# Patient Record
Sex: MALE | Race: WHITE | NOT HISPANIC OR LATINO | Employment: OTHER | URBAN - METROPOLITAN AREA
[De-identification: names, ages, dates, MRNs, and addresses within clinical notes are randomized per-mention and may not be internally consistent; named-entity substitution may affect disease eponyms.]

---
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
## 2022-04-10 ENCOUNTER — HISTORICAL (OUTPATIENT)
Dept: ADMINISTRATIVE | Facility: HOSPITAL | Age: 22
End: 2022-04-10

## 2022-04-27 VITALS — BODY MASS INDEX: 24.3 KG/M2 | WEIGHT: 169.75 LBS | HEIGHT: 70 IN

## 2022-07-25 ENCOUNTER — HOSPITAL ENCOUNTER (OUTPATIENT)
Dept: RADIOLOGY | Facility: HOSPITAL | Age: 22
Discharge: HOME | End: 2022-07-25
Attending: ORTHOPAEDIC SURGERY
Payer: OTHER GOVERNMENT

## 2022-07-25 DIAGNOSIS — S83.242A ACUTE MEDIAL MENISCUS TEAR OF LEFT KNEE: ICD-10-CM

## 2022-07-25 PROCEDURE — 73721 MRI KNEE WITHOUT CONTRAST LEFT: ICD-10-PCS | Mod: 26,LT,, | Performed by: RADIOLOGY

## 2022-07-25 PROCEDURE — 73721 MRI JNT OF LWR EXTRE W/O DYE: CPT | Mod: TC,LT

## 2022-07-25 PROCEDURE — 73721 MRI JNT OF LWR EXTRE W/O DYE: CPT | Mod: 26,LT,, | Performed by: RADIOLOGY

## 2022-08-15 ENCOUNTER — HOSPITAL ENCOUNTER (OUTPATIENT)
Dept: PREADMISSION TESTING | Facility: OTHER | Age: 22
Discharge: HOME | End: 2022-08-15
Attending: ORTHOPAEDIC SURGERY
Payer: OTHER GOVERNMENT

## 2022-08-15 VITALS
BODY MASS INDEX: 23.7 KG/M2 | SYSTOLIC BLOOD PRESSURE: 118 MMHG | WEIGHT: 160 LBS | OXYGEN SATURATION: 97 % | TEMPERATURE: 98 F | HEIGHT: 69 IN | DIASTOLIC BLOOD PRESSURE: 72 MMHG | HEART RATE: 76 BPM

## 2022-08-15 RX ORDER — SODIUM CHLORIDE, SODIUM LACTATE, POTASSIUM CHLORIDE, CALCIUM CHLORIDE 600; 310; 30; 20 MG/100ML; MG/100ML; MG/100ML; MG/100ML
INJECTION, SOLUTION INTRAVENOUS CONTINUOUS
Status: CANCELLED | OUTPATIENT
Start: 2022-08-15

## 2022-08-15 RX ORDER — ACETAMINOPHEN 500 MG
1000 TABLET ORAL
Status: CANCELLED | OUTPATIENT
Start: 2022-08-15 | End: 2022-08-15

## 2022-08-15 NOTE — DISCHARGE INSTRUCTIONS
Information to Prepare you for your Surgery    PRE-ADMIT TESTING -  [phone]    [address] [facility]          Your surgery has been scheduled at [facility]. We are pleased to have the opportunity to serve you. For Further Information please call [phone].    On the day of surgery please report to the Information Desk on the 1st floor.    CONTACT YOUR PHYSICIAN'S OFFICE THE DAY PRIOR TO YOUR SURGERY TO OBTAIN YOUR ARRIVAL TIME.     The evening before surgery do not eat anything after 9 p.m. ( this includes hard candy, chewing gum and mints).  You may only have GATORADE, POWERADE AND WATER  from 9 p.m. until you leave your home.   DO NOT DRINK ANY LIQUIDS ON THE WAY TO THE HOSPITAL.      Why does your anesthesiologist allow you to drink Gatorade/Powerade before surgery?  Gatorade/Powerade helps to increase your comfort before surgery and to decrease your nausea after surgery. The carbohydrates in Gatorade/Powerade help reduce your body's stress response to surgery.  If you are a diabetic-drink only water prior to surgery.      Current Visitor policy(12/27/2021) - Patients may have 2 visitors pre and post procedure. Only 2 visitors will be allowed in the Surgical building with the patient.     SPECIAL MEDICATION INSTRUCTIONS: TAKE medications checked off by the Anesthesiologist on your Medication List.    Angiogram Patients: Take medications as instructed by your physician, including aspirin.     Surgery Patients:    If you take ASPIRIN - Your PHYSICIAN/SURGEON will need to inform you IF/OR when you need to stop taking aspirin prior to your surgery.     Do Not take any medications containing IBUPROFEN.    Do Not Wear any make-up (especially eye make-up) to surgery. Please remove any false eyelashes or eyelash extensions. If you arrive the day of surgery with makeup/eyelashes on you will be required to remove prior to surgery. (There is a risk of corneal  abrasions if eye makeup/eyelash extensions are not removed)      Leave all valuables at home.   Do Not wear any jewelry or watches, including any metal in body piercings. Jewelry must be removed prior to coming to the hospital.  There is a possibility that rings that are unable to be removed may be cut off if they are on the surgical extremity.    Please remove all hair extensions, wigs, clips and any other metal accessories/ ornaments from your hair.  These items may pose a flammable/fire risk in Surgery and must be removed.    Do not shave your surgical area at least 5 days prior to your surgery. The surgical prep will be performed at the hospital according to Infection Control regulations.    Contact Lens must be removed before surgery. Either do not wear the contact lens or bring a case and solution for storage.  Please bring a container for eyeglasses or dentures as required.  Bring any paperwork your physician has provided, such as consent forms,  history and physicals, doctor's orders, etc.   Bring comfortable clothes that are loose fitting to wear upon discharge. Take into consideration the type of surgery being performed.  Maintain your diet as advised per your physician the day prior to surgery.      Adequate rest the night before surgery is advised.   Park in the Parking lot behind the hospital or in the [employer] [facility] across the street from the parking lot. Parking is complimentary.  If you will be discharged the same day as your procedure, please arrange for a responsible adult to drive you home or to accompany you if traveling by taxi.   YOU WILL NOT BE PERMITTED TO DRIVE OR TO LEAVE THE HOSPITAL ALONE AFTER SURGERY.   If you are being discharged the same day, it is strongly recommended that you arrange for someone to remain with you for the first 24 hrs following your surgery.    The Surgeon will speak to your family/visitor after your surgery regarding the outcome of your surgery and post op  care.  The Surgeon may speak to you after your surgery, but there is a possibility you may not remember the details.  Please check with your family members regarding the conversation with the Surgeon.    We strongly recommend whoever is bringing you home be present for discharge instructions.  This will ensure a thorough understanding for your post op home care.    ALL CHILDREN MUST ALWAYS BE ACCOMPANIED BY AN ADULT.    Visitors-Refer to current Visitor policy handouts.    Thank you for your cooperation.  The Staff of [facility].            Bathing Instructions with Hibiclens    Shower the evening before and morning of your procedure with Hibiclens:  Wash your face with water and your regular face wash/soap  Apply Hibiclens directly on your skin or on a wet washcloth and wash gently. When showering: Move away from the shower stream when applying Hibiclens to avoid rinsing off too soon.  Rinse thoroughly with warm water  Do not dilute Hibiclens        Dry off as usual, do not use any deodorant, powder, body lotions, perfume, after shave or cologne.

## 2022-10-17 ENCOUNTER — HOSPITAL ENCOUNTER (OUTPATIENT)
Dept: RADIOLOGY | Facility: CLINIC | Age: 22
Discharge: HOME | End: 2022-10-17
Attending: ORTHOPAEDIC SURGERY
Payer: COMMERCIAL

## 2022-10-17 ENCOUNTER — OFFICE VISIT (OUTPATIENT)
Dept: ORTHOPEDICS | Facility: CLINIC | Age: 22
End: 2022-10-17
Payer: COMMERCIAL

## 2022-10-17 VITALS
HEIGHT: 69 IN | SYSTOLIC BLOOD PRESSURE: 110 MMHG | TEMPERATURE: 98 F | BODY MASS INDEX: 23.99 KG/M2 | WEIGHT: 162 LBS | HEART RATE: 75 BPM | DIASTOLIC BLOOD PRESSURE: 69 MMHG

## 2022-10-17 DIAGNOSIS — S83.512A RUPTURE OF ANTERIOR CRUCIATE LIGAMENT OF LEFT KNEE, INITIAL ENCOUNTER: Primary | ICD-10-CM

## 2022-10-17 DIAGNOSIS — S83.242A ACUTE MEDIAL MENISCUS TEAR OF LEFT KNEE, INITIAL ENCOUNTER: ICD-10-CM

## 2022-10-17 DIAGNOSIS — M25.562 ACUTE PAIN OF LEFT KNEE: ICD-10-CM

## 2022-10-17 PROCEDURE — 3078F DIAST BP <80 MM HG: CPT | Mod: CPTII,,, | Performed by: ORTHOPAEDIC SURGERY

## 2022-10-17 PROCEDURE — 3074F PR MOST RECENT SYSTOLIC BLOOD PRESSURE < 130 MM HG: ICD-10-PCS | Mod: CPTII,,, | Performed by: ORTHOPAEDIC SURGERY

## 2022-10-17 PROCEDURE — 3078F PR MOST RECENT DIASTOLIC BLOOD PRESSURE < 80 MM HG: ICD-10-PCS | Mod: CPTII,,, | Performed by: ORTHOPAEDIC SURGERY

## 2022-10-17 PROCEDURE — 99203 OFFICE O/P NEW LOW 30 MIN: CPT | Mod: ,,, | Performed by: ORTHOPAEDIC SURGERY

## 2022-10-17 PROCEDURE — 1159F MED LIST DOCD IN RCRD: CPT | Mod: CPTII,,, | Performed by: ORTHOPAEDIC SURGERY

## 2022-10-17 PROCEDURE — 3074F SYST BP LT 130 MM HG: CPT | Mod: CPTII,,, | Performed by: ORTHOPAEDIC SURGERY

## 2022-10-17 PROCEDURE — 73564 X-RAY EXAM KNEE 4 OR MORE: CPT | Mod: LT,,, | Performed by: ORTHOPAEDIC SURGERY

## 2022-10-17 PROCEDURE — 1159F PR MEDICATION LIST DOCUMENTED IN MEDICAL RECORD: ICD-10-PCS | Mod: CPTII,,, | Performed by: ORTHOPAEDIC SURGERY

## 2022-10-17 PROCEDURE — 73564 XR KNEE COMP 4 OR MORE VIEWS LEFT: ICD-10-PCS | Mod: LT,,, | Performed by: ORTHOPAEDIC SURGERY

## 2022-10-17 PROCEDURE — 99203 PR OFFICE/OUTPT VISIT, NEW, LEVL III, 30-44 MIN: ICD-10-PCS | Mod: ,,, | Performed by: ORTHOPAEDIC SURGERY

## 2022-10-17 RX ORDER — SODIUM CHLORIDE 9 MG/ML
INJECTION, SOLUTION INTRAVENOUS CONTINUOUS
Status: CANCELLED | OUTPATIENT
Start: 2022-10-17

## 2022-10-17 NOTE — PROGRESS NOTES
Chief Complaint:   Chief Complaint   Patient presents with    Left Knee - Pain    Knee Pain     Hurt March 30 playing basketball and states he came down and just had lots of pain and swollen. Saw an orthopedic in [place] but couldn't get surgery done there. MRI shows an acl tear and meniscus tear. Not in a lot of pain but if he tweaks it then it hurts and takes medicine for it.       Consulting Physician: No ref. provider found    History of present illness:    he is a pleasant 22 y.o. year old male who on March 30 was playing some basketball and states when he came down with ball he might have hit another players knee and was in lots of pain and got swollen over the next few days. Was in [place] and went and saw a doctor there where he got a MRI done and it showed an anterior cruciate ligament tear and meniscus tear. Could not have surgery done there due to his work schedule. Would like to have surgery done now as soon as he can so that he can get back to work.  He has been protecting it over the last 6 months.  He is participating in home exercise program.  He will use over-the-counter medications intermittently.  He is in the [employer].  He has had a persistent instability of the knee with advanced activities.     History reviewed. No pertinent past medical history.    History reviewed. No pertinent surgical history.    No current outpatient medications on file.     No current facility-administered medications for this visit.       Review of patient's allergies indicates:  No Known Allergies    History reviewed. No pertinent family history.    Social History     Socioeconomic History    Marital status: Single   Tobacco Use    Smoking status: Never    Smokeless tobacco: Never   Substance and Sexual Activity    Alcohol use: Not Currently     Comment: occasional    Drug use: Never    Sexual activity: Not Currently     Partners: Female       Review of Systems:    Constitution:   Denies chills, fever, and  sweats.  HENT:   Denies headaches or blurry vision.  Cardiovascular:  Denies chest pain or irregular heart beat.  Respiratory:   Denies cough or shortness of breath.  Gastrointestinal:  Denies abdominal pain, nausea, or vomiting.  Musculoskeletal:   Denies muscle cramps.  Neurological:   Denies dizziness or focal weakness.  Psychiatric/Behavior: Normal mental status.  Hematology/Lymph:  Denies bleeding problem or easy bruising/bleeding.  Skin:    Denies rash or suspicious lesions.    Examination:    Vital Signs:    Vitals:    10/17/22 1431   PainSc:   3       There is no height or weight on file to calculate BMI.    Constitution:   Well-developed, well nourished patient in no acute distress.  Neurological:   Alert and oriented x 3 and cooperative to examination.     Psychiatric/Behavior: Normal mental status.  Respiratory:   No shortness of breath.  Eyes:    Extraoccular muscles intact  Skin:    No scars, rash or suspicious lesions.    MSK:   Standing exam  stance: normal alignment, no significant leg-length discrepancy  gait: normal    Knee examination  - General comments: unremarkable appearance    - Tenderness:medial jointline    Knee                  RIGHT    LEFT  Skin:                  Intact      Intact  ROM:                 0-130      0-130  Effusion:             Neg        trace  MJL TTP:           Neg         +  LJL TTP:            Neg         Neg  Elizabeth:         Neg          +  Pat crep:            Neg         Neg  Patella TTPs:     Neg         Neg  Patella grind:      Neg        Neg  Lachman:           Neg         +  Pivot shift:          Neg         +  Valgus stress:    Neg        Neg  Varus stress:      Neg        Neg  Posterior drawer: Neg       Neg    N-V intact intact  Hip: nml nml    Lower extremity edema:Negative     Imaging: X-rays ordered and images interpreted today personally by me of 4 views of the left knee show normal bony alignment.       Assessment: Rupture of anterior cruciate  ligament of left knee, initial encounter  -     X-Ray Knee Complete 4 or More Views Left; Future; Expected date: 10/17/2022    Acute medial meniscus tear of left knee, initial encounter      Plan:  I recommended surgical intervention:  Left knee arthroscopic ACL reconstruction with patellar tendon autograft, medial meniscus repair.  We discussed the details of the procedure and expected postoperative course.  We discussed the benefits of surgery which be to stabilize the knee.  We discussed the risks of surgery which is small but could be significant if he has retear, pain, stiffness, or infection.  After discussion like to proceed.  Plans for surgery October 27

## 2022-10-17 NOTE — H&P (VIEW-ONLY)
Chief Complaint:   Chief Complaint   Patient presents with    Left Knee - Pain    Knee Pain     Hurt March 30 playing basketball and states he came down and just had lots of pain and swollen. Saw an orthopedic in [place] but couldn't get surgery done there. MRI shows an acl tear and meniscus tear. Not in a lot of pain but if he tweaks it then it hurts and takes medicine for it.       Consulting Physician: No ref. provider found    History of present illness:    he is a pleasant 22 y.o. year old male who on March 30 was playing some basketball and states when he came down with ball he might have hit another players knee and was in lots of pain and got swollen over the next few days. Was in [place] and went and saw a doctor there where he got a MRI done and it showed an anterior cruciate ligament tear and meniscus tear. Could not have surgery done there due to his work schedule. Would like to have surgery done now as soon as he can so that he can get back to work.  He has been protecting it over the last 6 months.  He is participating in home exercise program.  He will use over-the-counter medications intermittently.  He is in [employer].  He has had a persistent instability of the knee with advanced activities.     History reviewed. No pertinent past medical history.    History reviewed. No pertinent surgical history.    No current outpatient medications on file.     No current facility-administered medications for this visit.       Review of patient's allergies indicates:  No Known Allergies    History reviewed. No pertinent family history.    Social History     Socioeconomic History    Marital status: Single   Tobacco Use    Smoking status: Never    Smokeless tobacco: Never   Substance and Sexual Activity    Alcohol use: Not Currently     Comment: occasional    Drug use: Never    Sexual activity: Not Currently     Partners: Female       Review of Systems:    Constitution:   Denies chills, fever, and  sweats.  HENT:   Denies headaches or blurry vision.  Cardiovascular:  Denies chest pain or irregular heart beat.  Respiratory:   Denies cough or shortness of breath.  Gastrointestinal:  Denies abdominal pain, nausea, or vomiting.  Musculoskeletal:   Denies muscle cramps.  Neurological:   Denies dizziness or focal weakness.  Psychiatric/Behavior: Normal mental status.  Hematology/Lymph:  Denies bleeding problem or easy bruising/bleeding.  Skin:    Denies rash or suspicious lesions.    Examination:    Vital Signs:    Vitals:    10/17/22 1431   PainSc:   3       There is no height or weight on file to calculate BMI.    Constitution:   Well-developed, well nourished patient in no acute distress.  Neurological:   Alert and oriented x 3 and cooperative to examination.     Psychiatric/Behavior: Normal mental status.  Respiratory:   No shortness of breath.  Eyes:    Extraoccular muscles intact  Skin:    No scars, rash or suspicious lesions.    MSK:   Standing exam  stance: normal alignment, no significant leg-length discrepancy  gait: normal    Knee examination  - General comments: unremarkable appearance    - Tenderness:medial jointline    Knee                  RIGHT    LEFT  Skin:                  Intact      Intact  ROM:                 0-130      0-130  Effusion:             Neg        trace  MJL TTP:           Neg         +  LJL TTP:            Neg         Neg  Elizabeth:         Neg          +  Pat crep:            Neg         Neg  Patella TTPs:     Neg         Neg  Patella grind:      Neg        Neg  Lachman:           Neg         +  Pivot shift:          Neg         +  Valgus stress:    Neg        Neg  Varus stress:      Neg        Neg  Posterior drawer: Neg       Neg    N-V intact intact  Hip: nml nml    Lower extremity edema:Negative     Imaging: X-rays ordered and images interpreted today personally by me of 4 views of the left knee show normal bony alignment.       Assessment: Rupture of anterior cruciate  ligament of left knee, initial encounter  -     X-Ray Knee Complete 4 or More Views Left; Future; Expected date: 10/17/2022    Acute medial meniscus tear of left knee, initial encounter      Plan:  I recommended surgical intervention:  Left knee arthroscopic ACL reconstruction with patellar tendon autograft, medial meniscus repair.  We discussed the details of the procedure and expected postoperative course.  We discussed the benefits of surgery which be to stabilize the knee.  We discussed the risks of surgery which is small but could be significant if he has retear, pain, stiffness, or infection.  After discussion like to proceed.  Plans for surgery October 27

## 2022-10-25 ENCOUNTER — ANESTHESIA EVENT (OUTPATIENT)
Dept: SURGERY | Facility: HOSPITAL | Age: 22
End: 2022-10-25
Payer: COMMERCIAL

## 2022-10-26 NOTE — ANESTHESIA PREPROCEDURE EVALUATION
10/26/2022  Migue Mistry is a 22 y.o., male.    Migue Mistry    Pre-op Diagnosis: Rupture of anterior cruciate ligament of left knee, initial encounter [S83.512A]  Acute medial meniscus tear of left knee, initial encounter [S83.242A]    Procedure(s):  RECONSTRUCTION, KNEE, ACL, ARTHROSCOPIC  ARTHROSCOPY,KNEE,WITH MENISCUS REPAIR     Review of patient's allergies indicates:  No Known Allergies    No current outpatient medications    ME KNEE SCOPE,AID ANT CRUCIATE REPAIR [99049] (RECONSTRUCTI*    Past Medical History:   Diagnosis Date    Torn ACL      History reviewed. No pertinent surgical history.    Social History     Tobacco Use    Smoking status: Never    Smokeless tobacco: Never   Substance Use Topics    Alcohol use: Yes     Alcohol/week: 1.0 standard drink     Types: 1 Cans of beer per week     Comment: occasional           Pre-op Assessment    I have reviewed the Patient Summary Reports.    I have reviewed the NPO Status.   I have reviewed the Medications.     Review of Systems  Anesthesia Hx:  No problems with previous Anesthesia  Denies Family Hx of Anesthesia complications.   Denies Personal Hx of Anesthesia complications.   Social:  Non-Smoker    Cardiovascular:   Exercise tolerance: good  Denies Angina.  Denies Orthopnea.  Denies PND.  Denies MIJARES.  Functional Capacity good / => 4 METS    Musculoskeletal:  Musculoskeletal Normal    Neurological:   Denies TIA. Denies CVA.    Psych:  Psychiatric Normal           Physical Exam  General: Well nourished, Alert and Oriented    Airway:  Mallampati: III   Mouth Opening: Normal  TM Distance: Normal  Tongue: Normal  Neck ROM: Normal ROM    Dental:  Intact    Chest/Lungs:  Clear to auscultation    Heart:  Rate: Normal  Rhythm: Regular Rhythm  No pretibial edema  No carotid bruits      Anesthesia Plan  Type of Anesthesia, risks & benefits  discussed:    Anesthesia Type: Gen Supraglottic Airway  Intra-op Monitoring Plan: Standard ASA Monitors  Post Op Pain Control Plan: multimodal analgesia  Induction:  IV  Airway Plan: Direct, Post-Induction  Informed Consent: Informed consent signed with the Patient and all parties understand the risks and agree with anesthesia plan.  All questions answered.   ASA Score: 2  Day of Surgery Review of History & Physical: H&P Update referred to the surgeon/provider.  Anesthesia Plan Notes: FEMORAL N BLOCK FOR POSTOP PAIN RELIEF AT SURGEON'S REQUEST  Risks including sensory & motor neuropathy, failed block, seizure- pt accepts    Ready For Surgery From Anesthesia Perspective.     .

## 2022-10-27 ENCOUNTER — HOSPITAL ENCOUNTER (OUTPATIENT)
Facility: HOSPITAL | Age: 22
Discharge: HOME | End: 2022-10-27
Attending: ORTHOPAEDIC SURGERY | Admitting: ORTHOPAEDIC SURGERY
Payer: COMMERCIAL

## 2022-10-27 ENCOUNTER — ANESTHESIA (OUTPATIENT)
Dept: SURGERY | Facility: HOSPITAL | Age: 22
End: 2022-10-27
Payer: COMMERCIAL

## 2022-10-27 DIAGNOSIS — S83.512A RUPTURE OF ANTERIOR CRUCIATE LIGAMENT OF LEFT KNEE, INITIAL ENCOUNTER: Primary | ICD-10-CM

## 2022-10-27 DIAGNOSIS — S83.242A ACUTE MEDIAL MENISCUS TEAR OF LEFT KNEE, INITIAL ENCOUNTER: ICD-10-CM

## 2022-10-27 PROCEDURE — 25000003 PHARM REV CODE 250: Performed by: NURSE ANESTHETIST, CERTIFIED REGISTERED

## 2022-10-27 PROCEDURE — 71000016 HC POSTOP RECOV ADDL HR: Performed by: ORTHOPAEDIC SURGERY

## 2022-10-27 PROCEDURE — 25000003 PHARM REV CODE 250: Performed by: ANESTHESIOLOGY

## 2022-10-27 PROCEDURE — 29888 PR KNEE SCOPE,AID ANT CRUCIATE REPAIR: ICD-10-PCS | Mod: LT,,, | Performed by: ORTHOPAEDIC SURGERY

## 2022-10-27 PROCEDURE — 71000033 HC RECOVERY, INTIAL HOUR: Performed by: ORTHOPAEDIC SURGERY

## 2022-10-27 PROCEDURE — 63600175 PHARM REV CODE 636 W HCPCS: Performed by: ORTHOPAEDIC SURGERY

## 2022-10-27 PROCEDURE — 36000711: Performed by: ORTHOPAEDIC SURGERY

## 2022-10-27 PROCEDURE — 63600175 PHARM REV CODE 636 W HCPCS: Performed by: NURSE ANESTHETIST, CERTIFIED REGISTERED

## 2022-10-27 PROCEDURE — C1889 IMPLANT/INSERT DEVICE, NOC: HCPCS | Performed by: ORTHOPAEDIC SURGERY

## 2022-10-27 PROCEDURE — C1713 ANCHOR/SCREW BN/BN,TIS/BN: HCPCS | Performed by: ORTHOPAEDIC SURGERY

## 2022-10-27 PROCEDURE — 37000009 HC ANESTHESIA EA ADD 15 MINS: Performed by: ORTHOPAEDIC SURGERY

## 2022-10-27 PROCEDURE — 63600175 PHARM REV CODE 636 W HCPCS

## 2022-10-27 PROCEDURE — 29888 PR KNEE SCOPE,AID ANT CRUCIATE REPAIR: ICD-10-PCS | Mod: AS,LT,, | Performed by: NURSE PRACTITIONER

## 2022-10-27 PROCEDURE — 37000008 HC ANESTHESIA 1ST 15 MINUTES: Performed by: ORTHOPAEDIC SURGERY

## 2022-10-27 PROCEDURE — 71000015 HC POSTOP RECOV 1ST HR: Performed by: ORTHOPAEDIC SURGERY

## 2022-10-27 PROCEDURE — 36000710: Performed by: ORTHOPAEDIC SURGERY

## 2022-10-27 PROCEDURE — 25000003 PHARM REV CODE 250: Performed by: ORTHOPAEDIC SURGERY

## 2022-10-27 PROCEDURE — 27201423 OPTIME MED/SURG SUP & DEVICES STERILE SUPPLY: Performed by: ORTHOPAEDIC SURGERY

## 2022-10-27 PROCEDURE — 29888 ARTHRS AID ACL RPR/AGMNTJ: CPT | Mod: AS,LT,, | Performed by: NURSE PRACTITIONER

## 2022-10-27 PROCEDURE — 29888 ARTHRS AID ACL RPR/AGMNTJ: CPT | Mod: LT,,, | Performed by: ORTHOPAEDIC SURGERY

## 2022-10-27 DEVICE — BTB TIGHTROPE W/ DEPLOYING SUTURE
Type: IMPLANTABLE DEVICE | Site: KNEE | Status: FUNCTIONAL
Brand: ARTHREX®

## 2022-10-27 DEVICE — SCRW,CANN. INT.,W/DISP SHTH
Type: IMPLANTABLE DEVICE | Site: KNEE | Status: FUNCTIONAL
Brand: ARTHREX®

## 2022-10-27 RX ORDER — MIDAZOLAM HYDROCHLORIDE 1 MG/ML
INJECTION INTRAMUSCULAR; INTRAVENOUS
Status: COMPLETED
Start: 2022-10-27 | End: 2022-10-27

## 2022-10-27 RX ORDER — MIDAZOLAM HYDROCHLORIDE 1 MG/ML
2 INJECTION INTRAMUSCULAR; INTRAVENOUS
Status: ACTIVE | OUTPATIENT
Start: 2022-10-27 | End: 2022-10-27

## 2022-10-27 RX ORDER — PROMETHAZINE HYDROCHLORIDE 25 MG/1
25 TABLET ORAL EVERY 6 HOURS PRN
Status: DISCONTINUED | OUTPATIENT
Start: 2022-10-27 | End: 2022-10-27 | Stop reason: HOSPADM

## 2022-10-27 RX ORDER — ONDANSETRON 2 MG/ML
4 INJECTION INTRAMUSCULAR; INTRAVENOUS DAILY PRN
Status: DISCONTINUED | OUTPATIENT
Start: 2022-10-27 | End: 2022-10-27 | Stop reason: HOSPADM

## 2022-10-27 RX ORDER — HYDROMORPHONE HYDROCHLORIDE 2 MG/ML
0.2 INJECTION, SOLUTION INTRAMUSCULAR; INTRAVENOUS; SUBCUTANEOUS EVERY 5 MIN PRN
Status: DISCONTINUED | OUTPATIENT
Start: 2022-10-27 | End: 2022-10-27 | Stop reason: HOSPADM

## 2022-10-27 RX ORDER — MIDAZOLAM HYDROCHLORIDE 1 MG/ML
INJECTION INTRAMUSCULAR; INTRAVENOUS
Status: DISCONTINUED | OUTPATIENT
Start: 2022-10-27 | End: 2022-10-27

## 2022-10-27 RX ORDER — KETOROLAC TROMETHAMINE 10 MG/1
10 TABLET, FILM COATED ORAL 3 TIMES DAILY
Qty: 15 TABLET | Refills: 0 | Status: SHIPPED | OUTPATIENT
Start: 2022-10-27 | End: 2022-11-11

## 2022-10-27 RX ORDER — CEFAZOLIN SODIUM 2 G/100ML
2 INJECTION, SOLUTION INTRAVENOUS
Status: COMPLETED | OUTPATIENT
Start: 2022-10-27 | End: 2022-10-27

## 2022-10-27 RX ORDER — TRAMADOL HYDROCHLORIDE 50 MG/1
50 TABLET ORAL EVERY 4 HOURS PRN
Status: DISCONTINUED | OUTPATIENT
Start: 2022-10-27 | End: 2022-10-27 | Stop reason: HOSPADM

## 2022-10-27 RX ORDER — OXYCODONE AND ACETAMINOPHEN 5; 325 MG/1; MG/1
1 TABLET ORAL EVERY 6 HOURS PRN
Qty: 20 TABLET | Refills: 0 | Status: SHIPPED | OUTPATIENT
Start: 2022-10-27 | End: 2022-11-11

## 2022-10-27 RX ORDER — EPINEPHRINE 1 MG/ML
INJECTION, SOLUTION, CONCENTRATE INTRAVENOUS
Status: DISCONTINUED | OUTPATIENT
Start: 2022-10-27 | End: 2022-10-27 | Stop reason: HOSPADM

## 2022-10-27 RX ORDER — PROPOFOL 10 MG/ML
VIAL (ML) INTRAVENOUS
Status: DISCONTINUED | OUTPATIENT
Start: 2022-10-27 | End: 2022-10-27

## 2022-10-27 RX ORDER — GLYCOPYRROLATE 0.2 MG/ML
INJECTION INTRAMUSCULAR; INTRAVENOUS
Status: DISCONTINUED | OUTPATIENT
Start: 2022-10-27 | End: 2022-10-27

## 2022-10-27 RX ORDER — SODIUM CHLORIDE 0.9 % (FLUSH) 0.9 %
3 SYRINGE (ML) INJECTION EVERY 6 HOURS PRN
Status: DISCONTINUED | OUTPATIENT
Start: 2022-10-27 | End: 2022-10-27 | Stop reason: HOSPADM

## 2022-10-27 RX ORDER — KETOROLAC TROMETHAMINE 30 MG/ML
INJECTION, SOLUTION INTRAMUSCULAR; INTRAVENOUS
Status: DISCONTINUED | OUTPATIENT
Start: 2022-10-27 | End: 2022-10-27

## 2022-10-27 RX ORDER — EPINEPHRINE 1 MG/ML
INJECTION, SOLUTION, CONCENTRATE INTRAVENOUS
Status: DISCONTINUED
Start: 2022-10-27 | End: 2022-10-27 | Stop reason: HOSPADM

## 2022-10-27 RX ORDER — LIDOCAINE HYDROCHLORIDE 10 MG/ML
1 INJECTION, SOLUTION EPIDURAL; INFILTRATION; INTRACAUDAL; PERINEURAL ONCE
Status: DISCONTINUED | OUTPATIENT
Start: 2022-10-27 | End: 2022-10-27 | Stop reason: HOSPADM

## 2022-10-27 RX ORDER — PHENYLEPHRINE HCL IN 0.9% NACL 1 MG/10 ML
SYRINGE (ML) INTRAVENOUS
Status: DISCONTINUED | OUTPATIENT
Start: 2022-10-27 | End: 2022-10-27

## 2022-10-27 RX ORDER — PROCHLORPERAZINE EDISYLATE 5 MG/ML
5 INJECTION INTRAMUSCULAR; INTRAVENOUS EVERY 30 MIN PRN
Status: DISCONTINUED | OUTPATIENT
Start: 2022-10-27 | End: 2022-10-27 | Stop reason: HOSPADM

## 2022-10-27 RX ORDER — METHOCARBAMOL 750 MG/1
750 TABLET, FILM COATED ORAL 3 TIMES DAILY
Qty: 21 TABLET | Refills: 0 | Status: SHIPPED | OUTPATIENT
Start: 2022-10-27 | End: 2022-11-11

## 2022-10-27 RX ORDER — BUPIVACAINE HYDROCHLORIDE 2.5 MG/ML
INJECTION, SOLUTION EPIDURAL; INFILTRATION; INTRACAUDAL
Status: DISCONTINUED
Start: 2022-10-27 | End: 2022-10-27 | Stop reason: WASHOUT

## 2022-10-27 RX ORDER — LIDOCAINE HYDROCHLORIDE 20 MG/ML
INJECTION, SOLUTION EPIDURAL; INFILTRATION; INTRACAUDAL; PERINEURAL
Status: DISCONTINUED | OUTPATIENT
Start: 2022-10-27 | End: 2022-10-27

## 2022-10-27 RX ORDER — ONDANSETRON 2 MG/ML
4 INJECTION INTRAMUSCULAR; INTRAVENOUS EVERY 12 HOURS PRN
Status: DISCONTINUED | OUTPATIENT
Start: 2022-10-27 | End: 2022-10-27 | Stop reason: HOSPADM

## 2022-10-27 RX ORDER — HYDROCODONE BITARTRATE AND ACETAMINOPHEN 5; 325 MG/1; MG/1
1 TABLET ORAL EVERY 4 HOURS PRN
Status: DISCONTINUED | OUTPATIENT
Start: 2022-10-27 | End: 2022-10-27 | Stop reason: HOSPADM

## 2022-10-27 RX ORDER — MORPHINE SULFATE 4 MG/ML
3 INJECTION, SOLUTION INTRAMUSCULAR; INTRAVENOUS
Status: DISCONTINUED | OUTPATIENT
Start: 2022-10-27 | End: 2022-10-27 | Stop reason: HOSPADM

## 2022-10-27 RX ORDER — ROPIVACAINE HYDROCHLORIDE 5 MG/ML
INJECTION, SOLUTION EPIDURAL; INFILTRATION; PERINEURAL
Status: DISCONTINUED
Start: 2022-10-27 | End: 2022-10-27 | Stop reason: HOSPADM

## 2022-10-27 RX ORDER — SODIUM CHLORIDE, SODIUM GLUCONATE, SODIUM ACETATE, POTASSIUM CHLORIDE AND MAGNESIUM CHLORIDE 30; 37; 368; 526; 502 MG/100ML; MG/100ML; MG/100ML; MG/100ML; MG/100ML
INJECTION, SOLUTION INTRAVENOUS CONTINUOUS
Status: DISCONTINUED | OUTPATIENT
Start: 2022-10-27 | End: 2022-10-27 | Stop reason: HOSPADM

## 2022-10-27 RX ORDER — FENTANYL CITRATE 50 UG/ML
INJECTION, SOLUTION INTRAMUSCULAR; INTRAVENOUS
Status: DISCONTINUED | OUTPATIENT
Start: 2022-10-27 | End: 2022-10-27

## 2022-10-27 RX ORDER — DEXAMETHASONE SODIUM PHOSPHATE 4 MG/ML
INJECTION, SOLUTION INTRA-ARTICULAR; INTRALESIONAL; INTRAMUSCULAR; INTRAVENOUS; SOFT TISSUE
Status: DISCONTINUED | OUTPATIENT
Start: 2022-10-27 | End: 2022-10-27

## 2022-10-27 RX ORDER — ACETAMINOPHEN 10 MG/ML
INJECTION, SOLUTION INTRAVENOUS
Status: DISCONTINUED
Start: 2022-10-27 | End: 2022-10-27 | Stop reason: HOSPADM

## 2022-10-27 RX ORDER — MEPERIDINE HYDROCHLORIDE 25 MG/ML
12.5 INJECTION INTRAMUSCULAR; INTRAVENOUS; SUBCUTANEOUS EVERY 10 MIN PRN
Status: DISCONTINUED | OUTPATIENT
Start: 2022-10-27 | End: 2022-10-27 | Stop reason: HOSPADM

## 2022-10-27 RX ORDER — IPRATROPIUM BROMIDE AND ALBUTEROL SULFATE 2.5; .5 MG/3ML; MG/3ML
3 SOLUTION RESPIRATORY (INHALATION)
Status: DISCONTINUED | OUTPATIENT
Start: 2022-10-27 | End: 2022-10-27 | Stop reason: HOSPADM

## 2022-10-27 RX ORDER — DIPHENHYDRAMINE HYDROCHLORIDE 50 MG/ML
25 INJECTION INTRAMUSCULAR; INTRAVENOUS EVERY 6 HOURS PRN
Status: DISCONTINUED | OUTPATIENT
Start: 2022-10-27 | End: 2022-10-27 | Stop reason: HOSPADM

## 2022-10-27 RX ORDER — VANCOMYCIN HYDROCHLORIDE 1 G/20ML
INJECTION, POWDER, LYOPHILIZED, FOR SOLUTION INTRAVENOUS
Status: DISCONTINUED
Start: 2022-10-27 | End: 2022-10-27 | Stop reason: HOSPADM

## 2022-10-27 RX ORDER — SODIUM CHLORIDE 0.9 G/100ML
IRRIGANT IRRIGATION
Status: DISCONTINUED | OUTPATIENT
Start: 2022-10-27 | End: 2022-10-27 | Stop reason: HOSPADM

## 2022-10-27 RX ORDER — ONDANSETRON 2 MG/ML
INJECTION INTRAMUSCULAR; INTRAVENOUS
Status: DISCONTINUED | OUTPATIENT
Start: 2022-10-27 | End: 2022-10-27

## 2022-10-27 RX ORDER — ACETAMINOPHEN 10 MG/ML
INJECTION, SOLUTION INTRAVENOUS
Status: DISCONTINUED | OUTPATIENT
Start: 2022-10-27 | End: 2022-10-27

## 2022-10-27 RX ORDER — VANCOMYCIN HYDROCHLORIDE 500 MG/10ML
INJECTION, POWDER, LYOPHILIZED, FOR SOLUTION INTRAVENOUS
Status: DISCONTINUED | OUTPATIENT
Start: 2022-10-27 | End: 2022-10-27 | Stop reason: HOSPADM

## 2022-10-27 RX ADMIN — Medication 100 MCG: at 09:10

## 2022-10-27 RX ADMIN — FENTANYL CITRATE 100 MCG: 50 INJECTION, SOLUTION INTRAMUSCULAR; INTRAVENOUS at 08:10

## 2022-10-27 RX ADMIN — MIDAZOLAM 2 MG: 1 INJECTION INTRAMUSCULAR; INTRAVENOUS at 08:10

## 2022-10-27 RX ADMIN — HYDROCODONE BITARTRATE AND ACETAMINOPHEN 1 TABLET: 5; 325 TABLET ORAL at 11:10

## 2022-10-27 RX ADMIN — ONDANSETRON HYDROCHLORIDE 4 MG: 2 SOLUTION INTRAMUSCULAR; INTRAVENOUS at 08:10

## 2022-10-27 RX ADMIN — KETOROLAC TROMETHAMINE 30 MG: 30 INJECTION, SOLUTION INTRAMUSCULAR at 10:10

## 2022-10-27 RX ADMIN — SODIUM CHLORIDE, SODIUM GLUCONATE, SODIUM ACETATE, POTASSIUM CHLORIDE AND MAGNESIUM CHLORIDE: 526; 502; 368; 37; 30 INJECTION, SOLUTION INTRAVENOUS at 08:10

## 2022-10-27 RX ADMIN — ACETAMINOPHEN 1000 MG: 10 INJECTION, SOLUTION INTRAVENOUS at 08:10

## 2022-10-27 RX ADMIN — GLYCOPYRROLATE 0.2 MG: 0.2 INJECTION INTRAMUSCULAR; INTRAVENOUS at 08:10

## 2022-10-27 RX ADMIN — PROPOFOL 200 MG: 10 INJECTION, EMULSION INTRAVENOUS at 08:10

## 2022-10-27 RX ADMIN — CEFAZOLIN SODIUM 2 G: 2 INJECTION, SOLUTION INTRAVENOUS at 08:10

## 2022-10-27 RX ADMIN — SODIUM CHLORIDE, SODIUM GLUCONATE, SODIUM ACETATE, POTASSIUM CHLORIDE AND MAGNESIUM CHLORIDE: 526; 502; 368; 37; 30 INJECTION, SOLUTION INTRAVENOUS at 07:10

## 2022-10-27 RX ADMIN — SODIUM CHLORIDE, SODIUM GLUCONATE, SODIUM ACETATE, POTASSIUM CHLORIDE AND MAGNESIUM CHLORIDE: 526; 502; 368; 37; 30 INJECTION, SOLUTION INTRAVENOUS at 10:10

## 2022-10-27 RX ADMIN — LIDOCAINE HYDROCHLORIDE 50 MG: 20 INJECTION, SOLUTION EPIDURAL; INFILTRATION; INTRACAUDAL; PERINEURAL at 08:10

## 2022-10-27 RX ADMIN — MIDAZOLAM HYDROCHLORIDE 2 MG: 1 INJECTION INTRAMUSCULAR; INTRAVENOUS at 08:10

## 2022-10-27 RX ADMIN — DEXAMETHASONE SODIUM PHOSPHATE 4 MG: 4 INJECTION, SOLUTION INTRA-ARTICULAR; INTRALESIONAL; INTRAMUSCULAR; INTRAVENOUS; SOFT TISSUE at 08:10

## 2022-10-27 NOTE — ANESTHESIA POSTPROCEDURE EVALUATION
Anesthesia Post Evaluation    Patient: Migue Mistry    Procedure(s) Performed: Procedure(s) (LRB):  RECONSTRUCTION, KNEE, ACL, ARTHROSCOPIC (Left)  ARTHROSCOPY,KNEE,WITH MENISCUS REPAIR (Left)    Final Anesthesia Type: regional      Patient location during evaluation: OPS  Patient participation: Yes- Able to Participate  Level of consciousness: awake and alert  Post-procedure vital signs: reviewed and stable  Pain management: adequate  Airway patency: patent  MARSHALL mitigation strategies: Use of major conduction anesthesia (spinal/epidural) or peripheral nerve block and Multimodal analgesia  PONV status at discharge: No PONV  Anesthetic complications: no      Cardiovascular status: hemodynamically stable  Respiratory status: unassisted, spontaneous ventilation and room air  Hydration status: euvolemic  Follow-up not needed.  Comments: Stable peripheral blockade           Vitals Value Taken Time   /70 10/27/22 1230   Temp 36.3 °C (97.4 °F) 10/27/22 1139   Pulse 60 10/27/22 1231   Resp 20 10/27/22 1215   SpO2 99 % 10/27/22 1231   Vitals shown include unvalidated device data.      Event Time   Out of Recovery 11:33:00         Pain/Jevon Score: Pain Rating Prior to Med Admin: 5 (10/27/2022 11:50 AM)  Jevon Score: 10 (10/27/2022 12:57 PM)  Modified Jevon Score: 20 (10/27/2022 12:57 PM)

## 2022-10-27 NOTE — DISCHARGE SUMMARY
[facility] - Periop Services  Discharge Note  Short Stay    Procedure(s) (LRB):  RECONSTRUCTION, KNEE, ACL, ARTHROSCOPIC (Left)  ARTHROSCOPY,KNEE,WITH MENISCUS REPAIR (Left)      OUTCOME: Patient tolerated treatment/procedure well without complication and is now ready for discharge.    DISPOSITION: Home or Self Care    FINAL DIAGNOSIS:  <principal problem not specified>    FOLLOWUP: In clinic    DISCHARGE INSTRUCTIONS:  No discharge procedures on file.     TIME SPENT ON DISCHARGE: 10 minutes

## 2022-10-27 NOTE — PATIENT INSTRUCTIONS
[facility]  Yariel Judge Jr., MD  [address], [address],   [place] [address]  [phone], fax [phone]    ACL RECONSTRUCTION    DIET:  Following general anesthesia, start with clear liquids to decrease chances of nausea.  Begin with water, coffee, tea, ginger ale, sprite, or apple juice.  If tolerated, advance to Jell-o, soup, crackers, or toast.  Once these are tolerated, advance to a regular diet.    DRESSING:  Keep the dressing clean and dry.  Remove the dressing on the 3rd day after surgery and replace the gauze with bandaids.  If you have steri-strips or band-aids in place of stitches, allow them to stay in place as long as possible.  They usually fall off on their own within 7-10 days.  You may trim the edges as the steri-strips begin to curl.  Steri-strips can get wet in the shower-pat dry with a towel after showers.    SHOWERING:  You may shower 5 days after surgery.  Remove the dressing or band-aids before showering.  Leave the incisions open to air after showering.  You can cover the sutures with band-aids if clothing irritates the stitches.  You do not need to reapply a dressing, but you may do so if you continue to have drainage.  It is not uncommon to have drainage a few days after surgery.      ACTIVITY:  -  Ice should be applied to the knee for 30 minutes, 5-6 times per day, for the 1st week to help decrease pain and swelling.  After the first week, apply as needed, especially                                 after exercises and physical therapy.  -  Elevate the knee with 2-3 pillows under the ANKLE.  Do not elevate with pillows under the knee, this will keep the knee in a bent position.  It is important that the knee can be fully straightened in the early post op period.  -  Use crutches following surgery  -  You will begin to bear weight on your leg with therapy.      PAIN MEDICATION:  -  Use the Percocet as prescribed for pain after surgery.   Pain medicine can cause nausea and vomiting, especially on an empty stomach.  -  In addition, you can take Ketorolac as prescribed or Iburpofen 200 mg, 4 pills every 8 hours.  Ketorolac or Iburpofen medicine can irritate your stomach or cause heartburn.  If this happens to you, stop taking the medicine.  -  In addition, you can take Robaxin to help with muscle spasms.  -  Ice and elevation are more useful than pain medicine for surgical pain.  If you are having too much pain or discomfort, try more ice and higher elevation.  -  Do NOT drink alcohol while taking pain medication.    BLOOD CLOT PREVENTION:  If you are aware that you are at high risk for blood blots, notify your physician.  In general, you should walk s much as possible after surgery to increase blood circulation throughout your body. Please take Aspirin 81 mg, 1 pill twice a day for 2 weeks to help further prevent blood clots.    DRIVING OR FLYING:  You must NEVER drive while taking narcotic pain medication  You may ride in a car, take a train, or fly once you feel comfortable    PHYSICAL THERAPY:  Physical therapy will contact you 1-2 days after surgery to schedule a rehab appointment.  All exercises and activities must be within the protocol until rehab is complete.      WORK OR SCHOOL:  You may return to an office job or school whenver comfortable.  Most patients return ~ 1 week after surgery.  For more active jobs that require extended walking, squatting, or lifting, you can wait until after your follow up appointment.  Any other types of jobs should be discussed with Dr. Judge to determine a date for return to work.    PROBLEMS TO REPORT:       1.  Fever greater than 102 F       2.  Incision that is very red and/or draining pus       3.  Unable to urinate within 8 hours of surgery (a rare effect of being put to sleep for surgery)  Calls should be directed to the clinic:  [phone]    RETURN APPOINTMENT:  To confirm or reschedule your  appointment, call [phone]

## 2022-10-27 NOTE — TRANSFER OF CARE
"Anesthesia Transfer of Care Note    Patient: Migue Mistry    Procedure(s) Performed: Procedure(s) (LRB):  RECONSTRUCTION, KNEE, ACL, ARTHROSCOPIC (Left)  ARTHROSCOPY,KNEE,WITH MENISCUS REPAIR (Left)    Patient location: PACU    Anesthesia Type: general    Transport from OR: Transported from OR on room air with adequate spontaneous ventilation    Post pain: adequate analgesia    Post assessment: no apparent anesthetic complications    Post vital signs: stable    Level of consciousness: sedated    Complications: none    Transfer of care protocol was followed      Last vitals:   Visit Vitals  /62   Pulse 65   Temp 36.6 °C (97.9 °F) (Tympanic)   Resp 16   Ht 5' 9" (1.753 m)   Wt 75.8 kg (167 lb 1.7 oz)   SpO2 98%   BMI 24.68 kg/m²     "

## 2022-10-27 NOTE — OP NOTE
DATE OF SERVICE: 10/27/2022    SURGEON: Yariel Judge MD    PREOPERATIVE DIAGNOSIS: Left knee anterior cruciate ligament tear, medial meniscus tear    POSTOPERATIVE DIAGNOSIS: Left knee anterior cruciate ligament tear, medial meniscus tear (healed)    PROCEDURES: Left knee anterior cruciate ligament reconstruction with patella tendon autograft    ASSISTANT: Tracy Adams NP. Mrs. Adams was essential in graft preparation, retraction, graft passage, and closure    COMPLICATIONS: None.     DISPOSITION: Home.     IMPLANT: Arthrex    HISTORY OF PRESENT ILLNESS: Migue Mistry is a pleasant 22 y.o.-year-old, who injured his left knee.  Physical exam and MRI confirmed ACL tear.  We recommended reconstruction to prevent instability and pain. Risks, benefits and alternatives therapy were presented to the patient, and they elected to proceed.     PROCEDURE: Migue Mistry was initially seen in the preoperative area where the history and physical were reviewed without changes. The operative lower extremity was marked. Consents were reviewed. All questions were answered. Anesthesia performed a preoperative block to the lower extremity. The patient was then transferred to the operating room, placed supine on the operating table where general anesthesia was induced. The operative lower extremity was prepped and draped in sterile fashion. A nonsterile tourniquet was placed. It was insufflated to 100 mmHg above the systolic pressure, and we began our procedure.     We made an vertical incision along the course of the patella tendon. We sharply dissected the skin and subcutaneous tissue.  I lifted up the peritenon to expose the patella tendon.  I used a double bladed knife in order to harvest the middle third of the tendon.  I took a bony block off of the tibia as well as the patella.  This was brought to the back table prepped and sized.      The tendon was measured at size 10.  We then retensioned tendons on the back table,  covered them with vancomycin soak gauze, and returned to the arthroscopy portion.   We began the diagnostic arthroscopy. The patellofemoral joint was okay. There was no loose bodies or plicas. The medial and lateral gutters were clean. We then established an anterior medial portal using a spinal needle. The medial compartment had a no cartilage damage. The medial meniscus was healed.  We then turned our attention to the lateral compartment. The lateral femoral condyle and plateau were without cartilage damage. The lateral meniscus was intact. We then turned out attention to the notch. The ACL was torn. We debrided the scarred ACL remnant with a biter and shaver. We then began our tunnel preparation.     We used the accessory medial portal to access the lateral femoral condyle. We used a aiming guide to place our femoral pin. We drove this pin partially out of the lateral aspect of the knee. We then over reamed with an 10mm partially threaded reamer to a size just greater than the femoral plug. We then pulled the Beath pin out of the lateral aspect of the knee while shuttling a #5 permanent suture. We then turned our attention to the tibial tunnel. We used a elbow ACL guide placed at N + 10 of the tendon length in degrees into the ACL tibial footprint. We placed our 3/32 pin and then reamed with a fully threaded 10mm reamer. We cleaned the knee of all debris with a shaver. We then shuttled the shuttling suture from the femur down to the tibial tunnel. We then passed our graft up into the femur. We used a cortical button to secure the graft on the femoral side. We used a 9mm screw to secure the tibia.  The wounds were copiously irrigated.  Patella and tibia harvest sites were grafted.    We closed the patella tendon and peritenon layer starting with a #1 interrupted Vicryl. The subcutaneous tissue was closed with 2-0 Monocryl and the skin and portals were closed with 3-0 Monocryl suture.  A sterile dressing was  applied.  A hinged knee brace was applied.  The patient was awoken unremarkably from anesthesia and transferred back to the postoperative unit.

## 2022-10-27 NOTE — ANESTHESIA PROCEDURE NOTES
Intubation    Date/Time: 10/27/2022 8:50 AM  Performed by: Ezio Murrieta CRNA  Authorized by: Serg Lutz MD     Intubation:     Induction:  Intravenous    Mask Ventilation:  Easy mask    Attempts:  1    Attempted By:  CRNA    Difficult Airway Encountered?: No      Complications:  None    Airway Device:  Supraglottic airway/LMA    Airway Device Size:  4.0    Style/Cuff Inflation:  Cuffed    Placement Verified By:  Capnometry    Complicating Factors:  None    Findings Post-Intubation:  BS equal bilateral

## 2022-10-27 NOTE — OR NURSING
08:01  TIMEOUT DONE    08:08  DR. DUNCAN WILL ATTEMPT TO DO A LEFT FEMORAL NERVE BLOCK.    08:12  BLOCK COMPLETED AND TOLERATED WELL.

## 2022-10-28 VITALS
WEIGHT: 167.13 LBS | HEIGHT: 69 IN | SYSTOLIC BLOOD PRESSURE: 111 MMHG | DIASTOLIC BLOOD PRESSURE: 79 MMHG | BODY MASS INDEX: 24.75 KG/M2 | RESPIRATION RATE: 20 BRPM | TEMPERATURE: 97 F | OXYGEN SATURATION: 100 % | HEART RATE: 57 BPM

## 2022-10-28 DIAGNOSIS — S83.519A SPRAIN OF ANTERIOR CRUCIATE LIGAMENT OF KNEE: Primary | ICD-10-CM

## 2022-10-28 PROBLEM — Z98.890 S/P ACL RECONSTRUCTION: Status: ACTIVE | Noted: 2022-10-28

## 2022-10-28 NOTE — PROGRESS NOTES
[facility]  Physical Therapy Initial Evaluation    Name: Zahraa Mistry  Clinic Number: [id number]    Therapy Diagnosis:   Encounter Diagnoses   Name Primary?    Pain and swelling of left knee Yes    Decreased ROM of left knee     S/P ACL reconstruction      Physician: Yariel Judge Jr., MD    Physician Orders: PT Eval and Treat  Medical Diagnosis from Referral: Left knee anterior cruciate ligament reconstruction with patella tendon autograft  Evaluation Date: 11/1/2022  Authorization Period Expiration: As needed  Plan of Care Expiration: 03/01/2022  Visit # / Visits authorized: 0/ As needed    Time In: 1100  Time Out: 1142  Total Appointment Time (timed & untimed codes): 42 minutes  Total Treatment time (time-based codes) separate from Evaluation: 24 minutes    Surgery: Left knee anterior cruciate ligament reconstruction with patella tendon autograft 10/27/22  Orthopedic Precautions: Per protocol, scanned into media section of EMR  Pertinent History: None    Subjective   ZAHRAA reports: original injury was ~5 years ago sustained during football. Since then has been ok with activity until recent acute left ACL and meniscus tear while playing basketball. Meniscus healing on its own, thus only had ACL-R on 10/27/2022. Pain has been minimal. Notes swelling to left leg, however no red flags.      Medical History:   Past Medical History:   Diagnosis Date    Torn ACL        Surgical History:   Zahraa Mistry  has no past surgical history on file.    Medications:   Zahraa has a current medication list which includes the following prescription(s): ketorolac, methocarbamol, and oxycodone-acetaminophen.    Allergies:   Review of patient's allergies indicates:  No Known Allergies     CMS Impairment/Limitation/Restriction for FOTO Survey  Therapist reviewed FOTO scores for Zahraa Mistry on 11/1/2022.   FOTO documents entered into [employer] - see Media section.    Eval Patient's Physical FS Primary  Measure: 24  Eval Risk Adjusted Statistical FOTO: 46  Eval Limitation Score: 76%  Category: Mobility  Eval LEFS: 9.4/80 = 11.75% functional    Objective          Posture/Appearance    Right - normal    Left - mild edema throughout lower extremity (mostly in medial knee compartment where there was also bruising); incision covered by steri-strips and self-applied bandages   Gait    Ambulating with bilateral axillary crutches, mostly on tip-toe of LLE; LLE with decreased foot flat, weight acceptance during stance      ROM    Right - WFL knee    Left - terminal knee extension (0 deg), ~90 deg flexion; hamstrings to 85 deg     Strength    HIP FLX - Right 5/5  HIP EXT - Right 5/5  HIP ER - Right 5/5  HIP IR - Right 5/5  HIP ABD - Right 5/5  HIP ADD - Right 5/5  ANKLE DF - Right 5/5  ANKLE PF - Right 5/5      Left leg deferred           TREATMENT     ZAHRAA received the treatments listed below:       Time Activities   Manual     TherAct     TherEx 24 min BFRT (quad sets), HEP (quad sets, SLR, isometric hip ext in supine), education   Gait     Neuro Re-ed     Modalities     E-Stim     Dry Needling     Canalith Repositioning       Home Exercises and Patient Education Provided    Education provided:   -Plan of care, HEP, protocol and precautions, bracing instructions, gait with assistive device and appropriate weaning per protocol    Written Home Exercises Provided: yes.  Exercises were reviewed and ZAHRAA was able to demonstrate them prior to the end of the session.  ZAHRAA demonstrated good  understanding of the education provided.     See EMR under Media for exercises provided 11/1/2022.    Assessment   Zahraa is a 22 y.o. male referred to outpatient Physical Therapy with a medical diagnosis of s/p left knee ACL-R with patella tendon autograft. Patient presents with left knee pain and swelling, left leg weakness, impaired functional mobility. Patient will benefit from skilled outpatient Physical Therapy to address the deficits  stated above and in the chart below, provide education, and to maximize patient's level of independence.    Patient prognosis is Good.     Plan of care discussed with patient: Yes  Patient's spiritual, cultural and educational needs considered and patient is agreeable to the plan of care and goals as stated below:    Anticipated Barriers for therapy: None    Goals:  Short Term Goals: 8 weeks   Patient will report at least 15% increase in functional capacity from initial LEFS score to indicate clinically significant functional improvement  Patient will report at least 15 point increase on initial FOTO score to indicate clinically significant functional improvement  Patient will demonstrate full day of ambulation without assistive device    Long Term Goals: 16 weeks   Patient will report at least 30% increase in functional capacity from initial LEFS score to indicate clinically significant functional improvement  Patient will report at least 30 point increase on initial FOTO score to indicate clinically significant functional improvement    Plan   Plan of care Certification: 11/1/2022 to 03/01/2022.    Outpatient Physical Therapy 2-3 times weekly for 12 weeks to include the following interventions: Gait Training, Manual Therapy, Moist Heat/ Ice, Neuromuscular Re-ed, Patient Education, Self Care, Therapeutic Activities, and Therapeutic Exercise.     Aneta Marrero, PT

## 2022-11-01 ENCOUNTER — CLINICAL SUPPORT (OUTPATIENT)
Dept: REHABILITATION | Facility: HOSPITAL | Age: 22
End: 2022-11-01
Payer: COMMERCIAL

## 2022-11-01 DIAGNOSIS — M25.662 DECREASED ROM OF LEFT KNEE: ICD-10-CM

## 2022-11-01 DIAGNOSIS — M25.562 PAIN AND SWELLING OF LEFT KNEE: Primary | ICD-10-CM

## 2022-11-01 DIAGNOSIS — M25.462 PAIN AND SWELLING OF LEFT KNEE: Primary | ICD-10-CM

## 2022-11-01 PROCEDURE — 97110 THERAPEUTIC EXERCISES: CPT

## 2022-11-01 PROCEDURE — 97162 PT EVAL MOD COMPLEX 30 MIN: CPT

## 2022-11-03 ENCOUNTER — CLINICAL SUPPORT (OUTPATIENT)
Dept: REHABILITATION | Facility: HOSPITAL | Age: 22
End: 2022-11-03
Payer: COMMERCIAL

## 2022-11-03 DIAGNOSIS — M25.662 DECREASED ROM OF LEFT KNEE: ICD-10-CM

## 2022-11-03 DIAGNOSIS — M25.562 PAIN AND SWELLING OF LEFT KNEE: Primary | ICD-10-CM

## 2022-11-03 DIAGNOSIS — R29.898 TRANSIENT LEFT LEG WEAKNESS: ICD-10-CM

## 2022-11-03 DIAGNOSIS — M25.462 PAIN AND SWELLING OF LEFT KNEE: Primary | ICD-10-CM

## 2022-11-03 PROCEDURE — 97112 NEUROMUSCULAR REEDUCATION: CPT

## 2022-11-03 PROCEDURE — 97110 THERAPEUTIC EXERCISES: CPT

## 2022-11-03 NOTE — PLAN OF CARE
Physical Therapy Treatment Note     Name: Zahraa Mistry  Clinic Number: [id number]    Therapy Diagnosis:   Encounter Diagnoses   Name Primary?    Pain and swelling of left knee Yes    Decreased ROM of left knee     Transient left leg weakness      Physician: Yariel Judge Jr., MD    Visit Date: 11/3/2022    Physician Orders: PT Eval and Treat  Medical Diagnosis from Referral: Left knee anterior cruciate ligament reconstruction with patella tendon autograft  Evaluation Date: 11/1/2022  Authorization Period Expiration: As needed  Plan of Care Expiration: 03/01/2022  Visit # / Visits authorized: 1/ As needed    Time In: 1255  Time Out: 1142  Total Billable Time: 47 minutes    Surgery: Left knee anterior cruciate ligament reconstruction with patella tendon autograft 10/27/22  Orthopedic Precautions: Per protocol, scanned into media section of EMR  Pertinent History: None    Subjective     Patient reports: When he stands for a long time he notices that the left leg swells. Has been doing HEP, and thinks he may have overdone 2/2 soreness the next day. Overall doing well.    CMS Impairment/Limitation/Restriction for FOTO Survey  Therapist reviewed FOTO scores for Zahraa Mistry on 11/1/2022.   FOTO documents entered into [employer] - see Media section.     Eval Patient's Physical FS Primary Measure: 24  Eval Risk Adjusted Statistical FOTO: 46  Eval Limitation Score: 76%  Category: Mobility  Eval LEFS: 9.4/80 = 11.75% functional    Objective     ZAHRAA received the following treatment:     Time Activities   Manual     TherAct     TherEx 17 min NuStep, cold pack to left knee   Gait     Neuro Re-ed 30 min BFRT - quad set, SLR, hip abd sidelying, hip extension prone, hip add sidelying; HS curl (active, active assisted), LAQ in prone (70 deg to 40 deg),  NuStep, gait with no AD   Modalities     E-Stim     Dry Needling     Canalith Repositioning           Home Exercises Provided and Patient Education Provided     Education  provided:   -Plan of care, HEP, assistive device weaning    Assessment     Patient is highly tolerant to all activities, which is expected given his athleticism and state of health. SLR in all four directions with quad lag, gait progressed to discontinuity of crutches (brace locked in 0 degrees extension). He will likely progress at fast pace and return to full activity fairly quickly.    Patient prognosis is Excellent.      Anticipated barriers to physical therapy: None    Goals: ZAHRAA Is progressing well towards his goals.  Short Term Goals: 8 weeks   Patient will report at least 15% increase in functional capacity from initial LEFS score to indicate clinically significant functional improvement  Patient will report at least 15 point increase on initial FOTO score to indicate clinically significant functional improvement  Patient will demonstrate full day of ambulation without assistive device     Long Term Goals: 16 weeks   Patient will report at least 30% increase in functional capacity from initial LEFS score to indicate clinically significant functional improvement  Patient will report at least 30 point increase on initial FOTO score to indicate clinically significant functional improvement    Plan     2-3x/week x 12 weeks    Aneta Marrero, PT

## 2022-11-07 ENCOUNTER — CLINICAL SUPPORT (OUTPATIENT)
Dept: REHABILITATION | Facility: HOSPITAL | Age: 22
End: 2022-11-07
Payer: COMMERCIAL

## 2022-11-07 DIAGNOSIS — M25.662 DECREASED ROM OF LEFT KNEE: ICD-10-CM

## 2022-11-07 DIAGNOSIS — R29.898 TRANSIENT LEFT LEG WEAKNESS: ICD-10-CM

## 2022-11-07 DIAGNOSIS — M25.562 PAIN AND SWELLING OF LEFT KNEE: Primary | ICD-10-CM

## 2022-11-07 DIAGNOSIS — M25.462 PAIN AND SWELLING OF LEFT KNEE: Primary | ICD-10-CM

## 2022-11-07 PROCEDURE — 97110 THERAPEUTIC EXERCISES: CPT

## 2022-11-07 NOTE — PLAN OF CARE
Physical Therapy Treatment Note     Name: Zahraa Mistry  Clinic Number: [id number]    Therapy Diagnosis:   Encounter Diagnoses   Name Primary?    Pain and swelling of left knee Yes    Decreased ROM of left knee     Transient left leg weakness      Physician: Yariel Judge Jr., MD    Visit Date: 11/7/2022    Physician Orders: PT Eval and Treat  Medical Diagnosis from Referral: Left knee anterior cruciate ligament reconstruction with patella tendon autograft  Evaluation Date: 11/1/2022  Authorization Period Expiration: As needed  Plan of Care Expiration: 03/01/2022  Visit # / Visits authorized: 2/ As needed    Time In: 1011  Time Out: 1104  Total Billable Time: 53 minutes    Surgery: Left knee anterior cruciate ligament reconstruction with patella tendon autograft 10/27/22  Orthopedic Precautions: Per protocol, scanned into media section of EMR  Pertinent History: None    Subjective     Patient reports: When he stands for a long time he notices that the left leg swells, but when he elevates the girth returns to normal. Has been doing HEP, and function of leg continues to improve. Overall doing well.eft knee does still buckle a bit from time to time, during weightbearing walking without device.    CMS Impairment/Limitation/Restriction for FOTO Survey  Therapist reviewed FOTO scores for Zahraa Mistry on 11/1/2022.   FOTO documents entered into [employer] - see Media section.     Eval Patient's Physical FS Primary Measure: 24  Eval Risk Adjusted Statistical FOTO: 46  Eval Limitation Score: 76%  Category: Mobility  Eval LEFS: 9.4/80 = 11.75% functional    Objective     ZAHRAA received the following treatment:     Time Activities   Manual     TherAct     TherEx 53 min NuStep, cold pack to left knee, BFRT - SLR, hip abd sidelying, hip extension prone, hip add sidelying, HS curl   Gait     Neuro Re-ed  BFRT - SLR, hip abd sidelying, hip extension prone, hip add sidelying, HS curl;    ), LAQ in prone (70 deg to 40 deg),   NuStep, gait with no AD   Modalities     E-Stim     Dry Needling     Canalith Repositioning           Home Exercises Provided and Patient Education Provided     Education provided:   -Plan of care, HEP, assistive device weaning    Assessment     Patient is highly tolerant to all activities, which is expected given his athleticism and state of health. Strength continues to improve, as he is now performing active prone hamstring curl to ~70 degrees. SLR in all four directions without quad lag. He will likely progress at fast pace and return to full activity fairly quickly.    Patient prognosis is Excellent.      Anticipated barriers to physical therapy: None    Goals: ZAHRAA Is progressing well towards his goals.  Short Term Goals: 8 weeks   Patient will report at least 15% increase in functional capacity from initial LEFS score to indicate clinically significant functional improvement  Patient will report at least 15 point increase on initial FOTO score to indicate clinically significant functional improvement  Patient will demonstrate full day of ambulation without assistive device     Long Term Goals: 16 weeks   Patient will report at least 30% increase in functional capacity from initial LEFS score to indicate clinically significant functional improvement  Patient will report at least 30 point increase on initial FOTO score to indicate clinically significant functional improvement    Plan     2-3x/week x 12 weeks    Aneta Marrero, PT

## 2022-11-09 ENCOUNTER — CLINICAL SUPPORT (OUTPATIENT)
Dept: REHABILITATION | Facility: HOSPITAL | Age: 22
End: 2022-11-09
Payer: COMMERCIAL

## 2022-11-09 DIAGNOSIS — M25.562 PAIN AND SWELLING OF LEFT KNEE: Primary | ICD-10-CM

## 2022-11-09 DIAGNOSIS — M25.462 PAIN AND SWELLING OF LEFT KNEE: Primary | ICD-10-CM

## 2022-11-09 DIAGNOSIS — M25.662 DECREASED ROM OF LEFT KNEE: ICD-10-CM

## 2022-11-09 DIAGNOSIS — R29.898 TRANSIENT LEFT LEG WEAKNESS: ICD-10-CM

## 2022-11-09 PROCEDURE — 97110 THERAPEUTIC EXERCISES: CPT

## 2022-11-09 NOTE — PLAN OF CARE
Physical Therapy Treatment Note     Name: Zahraa Mistry  Clinic Number: [id number]    Therapy Diagnosis:   Encounter Diagnoses   Name Primary?    Pain and swelling of left knee Yes    Decreased ROM of left knee     Transient left leg weakness      Physician: Yariel Judge Jr., MD    Visit Date: 11/9/2022    Physician Orders: PT Eval and Treat  Medical Diagnosis from Referral: Left knee anterior cruciate ligament reconstruction with patella tendon autograft  Evaluation Date: 11/1/2022  Authorization Period Expiration: As needed  Plan of Care Expiration: 03/01/2022  Visit # / Visits authorized: 3/ As needed    Time In: 1002  Time Out: 1110  Total Billable Time: 68 minutes    Surgery: Left knee anterior cruciate ligament reconstruction with patella tendon autograft 10/27/22  Orthopedic Precautions: Per protocol, scanned into media section of EMR  Pertinent History: None    Subjective     Patient reports: No adverse reports. Notes decreased swelling in left knee and leg overall.     CMS Impairment/Limitation/Restriction for FOTO Survey  Therapist reviewed FOTO scores for Zahraa Mistry on 11/1/2022.   FOTO documents entered into [employer] - see Media section.     Eval Patient's Physical FS Primary Measure: 24  Eval Risk Adjusted Statistical FOTO: 46  Eval Limitation Score: 76%  Category: Mobility  Eval LEFS: 9.4/80 = 11.75% functional    Objective     ZAHRAA received the following treatment:     Time Activities   Manual     TherAct     TherEx 68 min NuStep, cold pack to left knee, BFRT - SLR, hip abd sidelying, hip extension prone, hip add sidelying, HS curl; LAQ (90 deg to 60 deg),  bridge with add squeeze, cold pack to left knee   Gait     Neuro Re-ed  BFRT - SLR, hip abd sidelying, hip extension prone, hip add sidelying, HS curl;    ), LAQ in prone (70 deg to 40 deg),  NuStep, gait with no AD   Modalities     E-Stim     Dry Needling     Canalith Repositioning           Home Exercises Provided and Patient Education  Provided     Education provided:   -Plan of care, HEP, assistive device weaning    Assessment     Continues to show significant improvement in quick time period. Left knee flexion to ~95 degrees with minimal pain. He continues to perform all exercises quicker, less difficulty, more fluid each session. Tomorrow marks week 3, thus can start ambulating with unlocked brace (brace unlocked for patient by this provider during PT session). Expect continued progression at fast pace and return to full activity fairly quickly.    Patient prognosis is Excellent.      Anticipated barriers to physical therapy: None    Goals: ZAHRAA Is progressing well towards his goals.  Short Term Goals: 8 weeks   Patient will report at least 15% increase in functional capacity from initial LEFS score to indicate clinically significant functional improvement  Patient will report at least 15 point increase on initial FOTO score to indicate clinically significant functional improvement  Patient will demonstrate full day of ambulation without assistive device     Long Term Goals: 16 weeks   Patient will report at least 30% increase in functional capacity from initial LEFS score to indicate clinically significant functional improvement  Patient will report at least 30 point increase on initial FOTO score to indicate clinically significant functional improvement    Plan     2-3x/week x 12 weeks    Aneta Marrero, PT

## 2022-11-11 ENCOUNTER — OFFICE VISIT (OUTPATIENT)
Dept: ORTHOPEDICS | Facility: CLINIC | Age: 22
End: 2022-11-11
Payer: COMMERCIAL

## 2022-11-11 ENCOUNTER — CLINICAL SUPPORT (OUTPATIENT)
Dept: REHABILITATION | Facility: HOSPITAL | Age: 22
End: 2022-11-11
Payer: COMMERCIAL

## 2022-11-11 VITALS — BODY MASS INDEX: 24.73 KG/M2 | WEIGHT: 167 LBS | HEIGHT: 69 IN

## 2022-11-11 DIAGNOSIS — R29.898 TRANSIENT LEFT LEG WEAKNESS: ICD-10-CM

## 2022-11-11 DIAGNOSIS — M25.462 PAIN AND SWELLING OF LEFT KNEE: Primary | ICD-10-CM

## 2022-11-11 DIAGNOSIS — Z98.890 S/P ACL RECONSTRUCTION: Primary | ICD-10-CM

## 2022-11-11 DIAGNOSIS — M25.562 PAIN AND SWELLING OF LEFT KNEE: Primary | ICD-10-CM

## 2022-11-11 DIAGNOSIS — M25.662 DECREASED ROM OF LEFT KNEE: ICD-10-CM

## 2022-11-11 PROCEDURE — 97140 MANUAL THERAPY 1/> REGIONS: CPT

## 2022-11-11 PROCEDURE — 1159F PR MEDICATION LIST DOCUMENTED IN MEDICAL RECORD: ICD-10-PCS | Mod: CPTII,,, | Performed by: ORTHOPAEDIC SURGERY

## 2022-11-11 PROCEDURE — 99024 PR POST-OP FOLLOW-UP VISIT: ICD-10-PCS | Mod: ,,, | Performed by: ORTHOPAEDIC SURGERY

## 2022-11-11 PROCEDURE — 3008F BODY MASS INDEX DOCD: CPT | Mod: CPTII,,, | Performed by: ORTHOPAEDIC SURGERY

## 2022-11-11 PROCEDURE — 97110 THERAPEUTIC EXERCISES: CPT

## 2022-11-11 PROCEDURE — 3008F PR BODY MASS INDEX (BMI) DOCUMENTED: ICD-10-PCS | Mod: CPTII,,, | Performed by: ORTHOPAEDIC SURGERY

## 2022-11-11 PROCEDURE — 99024 POSTOP FOLLOW-UP VISIT: CPT | Mod: ,,, | Performed by: ORTHOPAEDIC SURGERY

## 2022-11-11 PROCEDURE — 1159F MED LIST DOCD IN RCRD: CPT | Mod: CPTII,,, | Performed by: ORTHOPAEDIC SURGERY

## 2022-11-11 RX ORDER — TRIPROLIDINE/PSEUDOEPHEDRINE 2.5MG-60MG
TABLET ORAL EVERY 6 HOURS PRN
COMMUNITY

## 2022-11-11 NOTE — PROGRESS NOTES
Chief Complaint:   Chief Complaint   Patient presents with    Left Knee - Post-op Evaluation    Post-op Evaluation     L knee ACL recon w/ patella tendon autograft 10/27/22 g 1/25/23, reports some soreness/tightness/swelling, attending PT       History of present illness:  10/27/2022: Left ACL recon with patella tendon autograft    He returns today.  His pain is under good control.  He is working in therapy    Musculoskeletal:   Incisions healed.  Range of motion 0-90 degrees    Imaging:        Assessment: S/P ACL reconstruction        Plan:  Doing well status post above.  Continue rehab.  I will see him back in 4 weeks for radiographs of left knee

## 2022-11-11 NOTE — PLAN OF CARE
Physical Therapy Treatment Note     Name: Zahraa Mistry  Clinic Number: [id number]    Therapy Diagnosis:   Encounter Diagnoses   Name Primary?    Pain and swelling of left knee Yes    Decreased ROM of left knee     Transient left leg weakness      Physician: Yariel Judge Jr., MD    Visit Date: 11/11/2022    Physician Orders: PT Eval and Treat  Medical Diagnosis from Referral: Left knee anterior cruciate ligament reconstruction with patella tendon autograft  Evaluation Date: 11/1/2022  Authorization Period Expiration: As needed  Plan of Care Expiration: 03/01/2022  Visit # / Visits authorized: 4/ As needed    Time In: 1003  Time Out: 1050  Total Billable Time: 47 minutes    Surgery: Left knee anterior cruciate ligament reconstruction with patella tendon autograft 10/27/22  Orthopedic Precautions: Per protocol, scanned into media section of EMR  Pertinent History: None    Subjective     Patient reports: No adverse reports. States had follow up for orthopod this AM, and all went well.    CMS Impairment/Limitation/Restriction for FOTO Survey  Therapist reviewed FOTO scores for Zahraa Mistry on 11/1/2022.   FOTO documents entered into EPIC - see Media section.     Eval Patient's Physical FS Primary Measure: 24  Eval Risk Adjusted Statistical FOTO: 46  Eval Limitation Score: 76%  Category: Mobility  Eval LEFS: 9.4/80 = 11.75% functional    Objective     ZAHRAA received the following treatment:     Time Activities   Manual 10 min Left knee tibial on femoral internal rotation and posterior drawer grades 1-2 with movement    TherAct     TherEx 37 min NuStep, LLE (4-way hip, prone HS curl, LAQ 90 deg to 45 deg), bridge with add squeeze, mini squats, left knee posterior capsule stretch (seated with weights hanging above and below knee joint), quad set in seated position (heel on stool)   Gait     Neuro Re-ed  BFRT - SLR, hip abd sidelying, hip extension prone, hip add sidelying, HS curl;    ), LAQ in prone (70 deg to  40 deg),  NuStep, gait with no AD   Modalities     E-Stim     Dry Needling     Canalith Repositioning           Home Exercises Provided and Patient Education Provided     Education provided:   -Plan of care, HEP, assistive device weaning    Assessment     Continues to show significant improvement in quick time period. Left knee flexion to 97 degrees with minimal anterior knee tightness/pain. He continues to perform all exercises quicker, less difficulty, more fluid each session. Incorporated closed chain eccentrics for progressive loading, and posterior knee capsule stretching. Expect continued progression at fast pace and return to full activity fairly quickly.    Patient prognosis is Excellent.      Anticipated barriers to physical therapy: None    Goals: ZAHRAA Is progressing well towards his goals.  Short Term Goals: 8 weeks   Patient will report at least 15% increase in functional capacity from initial LEFS score to indicate clinically significant functional improvement  Patient will report at least 15 point increase on initial FOTO score to indicate clinically significant functional improvement  Patient will demonstrate full day of ambulation without assistive device     Long Term Goals: 16 weeks   Patient will report at least 30% increase in functional capacity from initial LEFS score to indicate clinically significant functional improvement  Patient will report at least 30 point increase on initial FOTO score to indicate clinically significant functional improvement    Plan     2-3x/week x 12 weeks    Aneta Marrero, PT

## 2022-11-14 ENCOUNTER — CLINICAL SUPPORT (OUTPATIENT)
Dept: REHABILITATION | Facility: HOSPITAL | Age: 22
End: 2022-11-14
Payer: COMMERCIAL

## 2022-11-14 DIAGNOSIS — R29.898 TRANSIENT LEFT LEG WEAKNESS: ICD-10-CM

## 2022-11-14 DIAGNOSIS — M25.562 PAIN AND SWELLING OF LEFT KNEE: Primary | ICD-10-CM

## 2022-11-14 DIAGNOSIS — M25.662 DECREASED ROM OF LEFT KNEE: ICD-10-CM

## 2022-11-14 DIAGNOSIS — M25.462 PAIN AND SWELLING OF LEFT KNEE: Primary | ICD-10-CM

## 2022-11-14 PROCEDURE — 97110 THERAPEUTIC EXERCISES: CPT

## 2022-11-14 PROCEDURE — 97116 GAIT TRAINING THERAPY: CPT

## 2022-11-14 NOTE — PLAN OF CARE
Physical Therapy Treatment Note     Name: Zahraa Mistry  Clinic Number: [id number]    Therapy Diagnosis:   Encounter Diagnoses   Name Primary?    Pain and swelling of left knee Yes    Decreased ROM of left knee     Transient left leg weakness      Physician: Yariel Judge Jr., MD    Visit Date: 11/14/2022    Physician Orders: PT Eval and Treat  Medical Diagnosis from Referral: Left knee anterior cruciate ligament reconstruction with patella tendon autograft  Evaluation Date: 11/1/2022  Authorization Period Expiration: As needed  Plan of Care Expiration: 03/01/2022  Visit # / Visits authorized: 5/ As needed    Time In: 1015  Time Out: 1108  Total Billable Time: 53 minutes    Surgery: Left knee anterior cruciate ligament reconstruction with patella tendon autograft 10/27/22  Orthopedic Precautions: Per protocol, scanned into media section of EMR  Pertinent History: None    Subjective     Patient reports: No adverse reports. Everything is going well with left knee recovery.    CMS Impairment/Limitation/Restriction for FOTO Survey  Therapist reviewed FOTO scores for Zahraa Mistry on 11/1/2022.   FOTO documents entered into [employer] - see Media section.     Eval Patient's Physical FS Primary Measure: 24  Eval Risk Adjusted Statistical FOTO: 46  Eval Limitation Score: 76%  Category: Mobility  Eval LEFS: 9.4/80 = 11.75% functional    Objective     ZAHRAA received the following treatment:     Time Activities   Manual  Left knee tibial on femoral internal rotation and posterior drawer grades 1-2 with movement    TherAct     TherEx 43 min NuStep, LLE (4-way hip, prone HS curl, LAQ 90 deg to 45 deg), mini squats, left knee posterior capsule stretch (seated with weights hanging above and below knee joint), quad set in seated position (heel on stool), calf board (gastroc bias, hamstrings bias), knee flexion lunge stretch   Gait 10 min gait with visual cueing via mirror, retro step, standing marching   Neuro Re-ed  BFRT - SLR,  hip abd sidelying, hip extension prone, hip add sidelying, HS curl;    ), LAQ in prone (70 deg to 40 deg),  NuStep, gait with no AD   Modalities     E-Stim     Dry Needling     Canalith Repositioning           Home Exercises Provided and Patient Education Provided     Education provided:   -Plan of care, HEP, assistive device weaning    Assessment     Continues to show significant improvement in quick time period. Knee flexion lunge stretch on steps, he reported some buckling/giving way when coming from end range knee flexion to extended position and he was concerned about it; rechecked it into comfortable range instead of full end range and unable to reproduce, and also assessed integrity of ACL via Lachman's test which was negative (may check anterior drawer next session based on his subjective report). He continues to perform all exercises quicker, less difficulty, more fluid each session. Incorporated closed chain eccentrics for progressive loading, and posterior knee capsule stretching. Gait with left trendelenburg thus visual cueing via mirror and activities to help facilitate hip/knee flexion on swing. Expect continued progression at fast pace and return to full activity fairly quickly.    Patient prognosis is Excellent.      Anticipated barriers to physical therapy: None    Goals: ZAHRAA Is progressing well towards his goals.  Short Term Goals: 8 weeks   Patient will report at least 15% increase in functional capacity from initial LEFS score to indicate clinically significant functional improvement  Patient will report at least 15 point increase on initial FOTO score to indicate clinically significant functional improvement  Patient will demonstrate full day of ambulation without assistive device     Long Term Goals: 16 weeks   Patient will report at least 30% increase in functional capacity from initial LEFS score to indicate clinically significant functional improvement  Patient will report at least 30 point  increase on initial FOTO score to indicate clinically significant functional improvement    Plan     2-3x/week x 12 weeks    Aneta Marrero, PT

## 2022-11-18 ENCOUNTER — CLINICAL SUPPORT (OUTPATIENT)
Dept: REHABILITATION | Facility: HOSPITAL | Age: 22
End: 2022-11-18
Payer: COMMERCIAL

## 2022-11-18 DIAGNOSIS — M25.662 DECREASED ROM OF LEFT KNEE: ICD-10-CM

## 2022-11-18 DIAGNOSIS — M25.462 PAIN AND SWELLING OF LEFT KNEE: Primary | ICD-10-CM

## 2022-11-18 DIAGNOSIS — M25.562 PAIN AND SWELLING OF LEFT KNEE: Primary | ICD-10-CM

## 2022-11-18 DIAGNOSIS — R29.898 TRANSIENT LEFT LEG WEAKNESS: ICD-10-CM

## 2022-11-18 PROCEDURE — 97112 NEUROMUSCULAR REEDUCATION: CPT

## 2022-11-18 PROCEDURE — 97116 GAIT TRAINING THERAPY: CPT

## 2022-11-18 PROCEDURE — 97110 THERAPEUTIC EXERCISES: CPT

## 2022-11-18 NOTE — PLAN OF CARE
Physical Therapy Treatment Note     Name: Zahraa Mistry  Clinic Number: [id number]    Therapy Diagnosis:   Encounter Diagnoses   Name Primary?    Pain and swelling of left knee Yes    Decreased ROM of left knee     Transient left leg weakness      Physician: Yariel Judge Jr., MD    Visit Date: 11/18/2022    Physician Orders: PT Eval and Treat  Medical Diagnosis from Referral: Left knee anterior cruciate ligament reconstruction with patella tendon autograft  Evaluation Date: 11/1/2022  Authorization Period Expiration: As needed  Plan of Care Expiration: 03/01/2022  Visit # / Visits authorized: 6/ As needed    Time In: 1011  Time Out: 1105  Total Billable Time: 54 minutes    Surgery: Left knee anterior cruciate ligament reconstruction with patella tendon autograft 10/27/22  Orthopedic Precautions: Per protocol, scanned into media section of EMR  Pertinent History: None    Subjective     Patient reports: No adverse reports. Everything is going well with left knee recovery. Has not had feeling of tightness/buckling that he described previous session during lunge stretch on stairs.    CMS Impairment/Limitation/Restriction for FOTO Survey  Therapist reviewed FOTO scores for Zahraa Mistry on 11/1/2022.   FOTO documents entered into EPIC - see Media section.  Eval Patient's Physical FS Primary Measure: 24  Eval Risk Adjusted Statistical FOTO: 46  Eval Limitation Score: 76%  Category: Mobility  Eval LEFS: 9.4/80 = 11.75% functional      Therapist reviewed FOTO scores for Zahraa Mistry on 11/18/2022.   FOTO documents entered into EPIC - see Media section.  Eval Patient's Physical FS Primary Measure: 44  Eval Risk Adjusted Statistical FOTO: 46  Eval Limitation Score: 56%  Category: Mobility  Eval LEFS: 28.4/80 = 35.50% functional    Objective     ZAHRAA received the following treatment:     Time Activities   Manual  Left knee tibial on femoral internal rotation and posterior drawer grades 1-2 with movement     TherAct  left knee posterior capsule stretch (seated with weights hanging above and below knee joint), quad set in seated position (heel on stool)       TherEx 27 min LLE (4-way hip, prone HS curl, LAQ 90 deg to 45 deg), mini squats, wall TKE ball squeeze, calf board (gastroc bias, hamstrings bias), knee flexion lunge stretch       Gait 10 min Gait with visual cueing via mirror,(normal step, high march step to reduce trendelenburg), retro step, standing marching   Neuro Re-ed 17 min Russian e-stim to VMO and vastus lateralis co-contract with (wall TKE ball squeeze, mini squat holds, single leg stance with mini squat holds)   Modalities     E-Stim     Dry Needling     Canalith Repositioning           Home Exercises Provided and Patient Education Provided     Education provided:   -Plan of care, HEP, assistive device weaning    Assessment     Continues to show significant improvement in quick time period. He continues to perform all exercises quicker, less difficulty, more fluid each session. Incorporated closed chain eccentrics for progressive loading, and posterior knee capsule stretching. Gait with left trendelenburg thus visual cueing via mirror and activities to help facilitate hip/knee flexion on swing. Expect continued progression at fast pace and return to full activity fairly quickly.    Patient prognosis is Excellent.      Anticipated barriers to physical therapy: None    Goals: ZAHRAA Is progressing well towards his goals.  Short Term Goals: 8 weeks   Patient will report at least 15% increase in functional capacity from initial LEFS score to indicate clinically significant functional improvement (goal met)  Patient will report at least 15 point increase on initial FOTO score to indicate clinically significant functional improvement (goal met)  Patient will demonstrate full day of ambulation without assistive device (goal met)     Long Term Goals: 16 weeks   Patient will report at least 30% increase in  functional capacity from initial LEFS score to indicate clinically significant functional improvement  Patient will report at least 30 point increase on initial FOTO score to indicate clinically significant functional improvement    Plan     2-3x/week x 12 weeks    Aneta Marrero, PT

## 2022-11-21 ENCOUNTER — CLINICAL SUPPORT (OUTPATIENT)
Dept: REHABILITATION | Facility: HOSPITAL | Age: 22
End: 2022-11-21
Payer: COMMERCIAL

## 2022-11-21 DIAGNOSIS — M25.562 PAIN AND SWELLING OF LEFT KNEE: Primary | ICD-10-CM

## 2022-11-21 DIAGNOSIS — R29.898 TRANSIENT LEFT LEG WEAKNESS: ICD-10-CM

## 2022-11-21 DIAGNOSIS — M25.662 DECREASED ROM OF LEFT KNEE: ICD-10-CM

## 2022-11-21 DIAGNOSIS — M25.462 PAIN AND SWELLING OF LEFT KNEE: Primary | ICD-10-CM

## 2022-11-21 PROCEDURE — 97110 THERAPEUTIC EXERCISES: CPT

## 2022-11-21 PROCEDURE — 97530 THERAPEUTIC ACTIVITIES: CPT

## 2022-11-21 PROCEDURE — 97116 GAIT TRAINING THERAPY: CPT

## 2022-11-21 NOTE — PLAN OF CARE
Physical Therapy Treatment Note     Name: Zahraa Mistry  Clinic Number: [id number]    Therapy Diagnosis:   Encounter Diagnoses   Name Primary?    Pain and swelling of left knee Yes    Decreased ROM of left knee     Transient left leg weakness      Physician: Yariel Judge Jr., MD    Visit Date: 11/21/2022    Physician Orders: PT Eval and Treat  Medical Diagnosis from Referral: Left knee anterior cruciate ligament reconstruction with patella tendon autograft  Evaluation Date: 11/1/2022  Authorization Period Expiration: As needed  Plan of Care Expiration: 03/01/2022  Visit # / Visits authorized: 7/ As needed    Time In: 1013  Time Out: 1053  Total Billable Time: 40 minutes    Surgery: Left knee anterior cruciate ligament reconstruction with patella tendon autograft 10/27/22  Orthopedic Precautions: Per protocol, scanned into media section of EMR  Pertinent History: None    Subjective     Patient reports: No adverse reports. Everything is going well with left knee recovery. Has not taken pain medication in some time, and hasn't been feeling any pain int he left knee.    CMS Impairment/Limitation/Restriction for FOTO Survey  Therapist reviewed FOTO scores for Zahraa Mistry on 11/1/2022.   FOTO documents entered into EPIC - see Media section.  Eval Patient's Physical FS Primary Measure: 24  Eval Risk Adjusted Statistical FOTO: 46  Eval Limitation Score: 76%  Category: Mobility  Eval LEFS: 9.4/80 = 11.75% functional      Therapist reviewed FOTO scores for Zahraa Mistry on 11/18/2022.   FOTO documents entered into EPIC - see Media section.  Eval Patient's Physical FS Primary Measure: 44  Eval Risk Adjusted Statistical FOTO: 46  Eval Limitation Score: 56%  Category: Mobility  Eval LEFS: 28.4/80 = 35.50% functional    Objective     ZAHRAA received the following treatment:     Time Activities   Manual  Left knee tibial on femoral internal rotation and posterior drawer grades 1-2 with movement    TherAct 17 min Knee  flx lunge stretch, calf board (gastroc bias, hamstrings bias), step ups (fwd, lat), mini squats, monster walk, wall TKE ball squeeze     TherEx 13 min LLE (4-way hip, prone HS curl, LAQ 90 deg to 45 deg)     Gait 10 min Gait with visual cueing via mirror,(normal step, high march step to reduce trendelenburg), retro step, standing marching   Neuro Re-ed  Russian e-stim to VMO and vastus lateralis co-contract with (wall TKE ball squeeze, mini squat holds, single leg stance with mini squat holds)   Modalities     E-Stim     Dry Needling     Canalith Repositioning           Home Exercises Provided and Patient Education Provided     Education provided:   -Plan of care, HEP, assistive device weaning    Assessment     Continues to show significant improvement in quick time period. He continues to perform all exercises quicker, less difficulty, more fluid each session. Incorporated closed chain eccentrics for progressive loading, and posterior knee capsule stretching. Gait with left trendelenburg thus visual cueing via mirror and activities to help facilitate hip/knee flexion on swing. Expect continued progression at fast pace and return to full activity fairly quickly.    Patient prognosis is Excellent.      Anticipated barriers to physical therapy: None    Goals: ZAHRAA Is progressing well towards his goals.  Short Term Goals: 8 weeks   Patient will report at least 15% increase in functional capacity from initial LEFS score to indicate clinically significant functional improvement (goal met)  Patient will report at least 15 point increase on initial FOTO score to indicate clinically significant functional improvement (goal met)  Patient will demonstrate full day of ambulation without assistive device (goal met)     Long Term Goals: 16 weeks   Patient will report at least 30% increase in functional capacity from initial LEFS score to indicate clinically significant functional improvement  Patient will report at least 30  point increase on initial FOTO score to indicate clinically significant functional improvement    Plan     2-3x/week x 12 weeks    Aneta Marrero, PT

## 2022-11-23 ENCOUNTER — CLINICAL SUPPORT (OUTPATIENT)
Dept: REHABILITATION | Facility: HOSPITAL | Age: 22
End: 2022-11-23
Payer: COMMERCIAL

## 2022-11-23 DIAGNOSIS — M25.462 PAIN AND SWELLING OF LEFT KNEE: Primary | ICD-10-CM

## 2022-11-23 DIAGNOSIS — R29.898 TRANSIENT LEFT LEG WEAKNESS: ICD-10-CM

## 2022-11-23 DIAGNOSIS — M25.562 PAIN AND SWELLING OF LEFT KNEE: Primary | ICD-10-CM

## 2022-11-23 DIAGNOSIS — M25.662 DECREASED ROM OF LEFT KNEE: ICD-10-CM

## 2022-11-23 PROCEDURE — 97110 THERAPEUTIC EXERCISES: CPT

## 2022-11-23 PROCEDURE — 97530 THERAPEUTIC ACTIVITIES: CPT

## 2022-11-23 NOTE — PLAN OF CARE
Physical Therapy Treatment Note     Name: Zahraa Mistry  Clinic Number: [id number]    Therapy Diagnosis:   Encounter Diagnoses   Name Primary?    Pain and swelling of left knee Yes    Decreased ROM of left knee     Transient left leg weakness      Physician: Yariel Judge Jr., MD    Visit Date: 11/23/2022    Physician Orders: PT Eval and Treat  Medical Diagnosis from Referral: Left knee anterior cruciate ligament reconstruction with patella tendon autograft  Evaluation Date: 11/1/2022  Authorization Period Expiration: As needed  Plan of Care Expiration: 03/01/2022  Visit # / Visits authorized: 8/ As needed    Time In: 1010  Time Out: 1049  Total Billable Time: 39 minutes    Surgery: Left knee anterior cruciate ligament reconstruction with patella tendon autograft 10/27/22  Orthopedic Precautions: Per protocol, scanned into media section of EMR  Pertinent History: None    Subjective     Patient reports: No adverse reports. Everything is going well with left knee recovery. Has not taken pain medication in some time, and hasn't been feeling any pain int he left knee.    CMS Impairment/Limitation/Restriction for FOTO Survey  Therapist reviewed FOTO scores for Zahraa Mistry on 11/1/2022.   FOTO documents entered into EPIC - see Media section.  Eval Patient's Physical FS Primary Measure: 24  Eval Risk Adjusted Statistical FOTO: 46  Eval Limitation Score: 76%  Category: Mobility  Eval LEFS: 9.4/80 = 11.75% functional      Therapist reviewed FOTO scores for Zahraa Mistry on 11/18/2022.   FOTO documents entered into EPIC - see Media section.  Eval Patient's Physical FS Primary Measure: 44  Eval Risk Adjusted Statistical FOTO: 46  Eval Limitation Score: 56%  Category: Mobility  Eval LEFS: 28.4/80 = 35.50% functional    Objective     ZAHRAA received the following treatment:     Time Activities   Manual  Left knee tibial on femoral internal rotation and posterior drawer grades 1-2 with movement    TherAct 23 min Knee  flx lunge stretch, calf board (gastroc bias, hamstrings bias), step ups (fwd, lat), mini squats (w/ clamshells), monster walk, wall TKE ball squeeze     TherEx 16 min LLE (4-way hip, prone HS curl, LAQ 90 deg to 45 deg)     Gait  Gait with visual cueing via mirror,(normal step, high march step to reduce trendelenburg), retro step, standing marching   Neuro Re-ed  Russian e-stim to VMO and vastus lateralis co-contract with (wall TKE ball squeeze, mini squat holds, single leg stance with mini squat holds)   Modalities     E-Stim     Dry Needling     Canalith Repositioning           Home Exercises Provided and Patient Education Provided     Education provided:   -Plan of care, HEP, assistive device weaning    Assessment     Continues to show significant improvement in quick time period. He continues to perform all exercises quicker, less difficulty, more fluid each session. Incorporated closed chain eccentrics for progressive loading, and posterior knee capsule stretching. Gait with left trendelenburg thus visual cueing via mirror and activities to help facilitate hip/knee flexion on swing. Expect continued progression at fast pace and return to full activity fairly quickly.    Patient prognosis is Excellent.      Anticipated barriers to physical therapy: None    Goals: ZAHRAA Is progressing well towards his goals.  Short Term Goals: 8 weeks   Patient will report at least 15% increase in functional capacity from initial LEFS score to indicate clinically significant functional improvement (goal met)  Patient will report at least 15 point increase on initial FOTO score to indicate clinically significant functional improvement (goal met)  Patient will demonstrate full day of ambulation without assistive device (goal met)     Long Term Goals: 16 weeks   Patient will report at least 30% increase in functional capacity from initial LEFS score to indicate clinically significant functional improvement  Patient will report at  least 30 point increase on initial FOTO score to indicate clinically significant functional improvement    Plan     2-3x/week x 12 weeks    Aneta Marrero, PT

## 2022-11-25 ENCOUNTER — CLINICAL SUPPORT (OUTPATIENT)
Dept: REHABILITATION | Facility: HOSPITAL | Age: 22
End: 2022-11-25
Payer: COMMERCIAL

## 2022-11-25 DIAGNOSIS — Z98.890 S/P ACL RECONSTRUCTION: Primary | ICD-10-CM

## 2022-11-25 PROCEDURE — 97110 THERAPEUTIC EXERCISES: CPT

## 2022-11-25 PROCEDURE — 97530 THERAPEUTIC ACTIVITIES: CPT

## 2022-11-25 NOTE — PLAN OF CARE
Physical Therapy Treatment Note     Name: Zahraa Mistry  Clinic Number: [id number]    Therapy Diagnosis:   No diagnosis found.    Physician: Yariel Judge Jr., MD    Visit Date: 11/25/2022    Physician Orders: PT Eval and Treat  Medical Diagnosis from Referral: Left knee anterior cruciate ligament reconstruction with patella tendon autograft  Evaluation Date: 11/1/2022  Authorization Period Expiration: As needed  Plan of Care Expiration: 03/01/2022  Visit # / Visits authorized: 9/ As needed    Time In: 1013  Time Out: 1055  Total Billable Time: 42 minutes    Surgery: Left knee anterior cruciate ligament reconstruction with patella tendon autograft 10/27/22  Orthopedic Precautions: Per protocol, scanned into media section of EMR  Pertinent History: None    Subjective     Patient reports: No adverse reports. Everything is going well with left knee recovery. Has not taken pain medication in some time, and hasn't been feeling any pain int he left knee.    CMS Impairment/Limitation/Restriction for FOTO Survey  Therapist reviewed FOTO scores for Zahraa Mistry on 11/1/2022.   FOTO documents entered into [employer] - see Media section.  Eval Patient's Physical FS Primary Measure: 24  Eval Risk Adjusted Statistical FOTO: 46  Eval Limitation Score: 76%  Category: Mobility  Eval LEFS: 9.4/80 = 11.75% functional      Therapist reviewed FOTO scores for Zahraa Mistry on 11/18/2022.   FOTO documents entered into [employer] - see Media section.  Eval Patient's Physical FS Primary Measure: 44  Eval Risk Adjusted Statistical FOTO: 46  Eval Limitation Score: 56%  Category: Mobility  Eval LEFS: 28.4/80 = 35.50% functional    Objective     ZAHRAA received the following treatment:     Time Activities   Manual  Left knee tibial on femoral internal rotation and posterior drawer grades 1-2 with movement    TherAct 27 min Knee flx lunge stretch, calf board (gastroc bias, hamstrings bias), step ups (fwd, lat), mini squats (w/ clamshells),  monster walk, wall TKE ball squeeze     TherEx 10 min LLE (4-way hip, prone HS curl, LAQ 90 deg to 45 deg)     Gait 5 min Gait with visual cueing via mirror,(normal step, high march step to reduce trendelenburg)   Neuro Re-ed  Russian e-stim to VMO and vastus lateralis co-contract with (wall TKE ball squeeze, mini squat holds, single leg stance with mini squat holds)   Modalities     E-Stim     Dry Needling     Canalith Repositioning           Home Exercises Provided and Patient Education Provided     Education provided:   -Plan of care, HEP, assistive device weaning    Assessment     Continues to show significant improvement in quick time period. He continues to perform all exercises quicker, less difficulty, more fluid each session. Gait with left trendelenburg thus visual cueing via mirror and activities to help facilitate hip/knee flexion on swing. Expect continued progression at fast pace and return to full activity fairly quickly.    Patient prognosis is Excellent.      Anticipated barriers to physical therapy: None    Goals: ZAHRAA Is progressing well towards his goals.  Short Term Goals: 8 weeks   Patient will report at least 15% increase in functional capacity from initial LEFS score to indicate clinically significant functional improvement (goal met)  Patient will report at least 15 point increase on initial FOTO score to indicate clinically significant functional improvement (goal met)  Patient will demonstrate full day of ambulation without assistive device (goal met)     Long Term Goals: 16 weeks   Patient will report at least 30% increase in functional capacity from initial LEFS score to indicate clinically significant functional improvement  Patient will report at least 30 point increase on initial FOTO score to indicate clinically significant functional improvement    Plan     2-3x/week x 12 weeks    Eliezer Valladares, PT

## 2022-11-28 ENCOUNTER — CLINICAL SUPPORT (OUTPATIENT)
Dept: REHABILITATION | Facility: HOSPITAL | Age: 22
End: 2022-11-28
Payer: COMMERCIAL

## 2022-11-28 DIAGNOSIS — M25.462 PAIN AND SWELLING OF LEFT KNEE: Primary | ICD-10-CM

## 2022-11-28 DIAGNOSIS — M25.662 DECREASED ROM OF LEFT KNEE: ICD-10-CM

## 2022-11-28 DIAGNOSIS — M25.562 PAIN AND SWELLING OF LEFT KNEE: Primary | ICD-10-CM

## 2022-11-28 DIAGNOSIS — R29.898 TRANSIENT LEFT LEG WEAKNESS: ICD-10-CM

## 2022-11-28 PROCEDURE — 97110 THERAPEUTIC EXERCISES: CPT

## 2022-11-28 PROCEDURE — 97530 THERAPEUTIC ACTIVITIES: CPT

## 2022-11-28 NOTE — PLAN OF CARE
Physical Therapy Treatment Note     Name: Zahraa Mistry  Clinic Number: [id number]    Therapy Diagnosis:   Encounter Diagnoses   Name Primary?    Pain and swelling of left knee Yes    Decreased ROM of left knee     Transient left leg weakness        Physician: Yariel Judge Jr., MD    Visit Date: 11/28/2022    Physician Orders: PT Eval and Treat  Medical Diagnosis from Referral: Left knee anterior cruciate ligament reconstruction with patella tendon autograft  Evaluation Date: 11/1/2022  Authorization Period Expiration: As needed  Plan of Care Expiration: 03/01/2022  Visit # / Visits authorized: 10/ As needed    Time In: 1009  Time Out: 1040  Total Billable Time: 31 minutes    Surgery: Left knee anterior cruciate ligament reconstruction with patella tendon autograft 10/27/22  Orthopedic Precautions: Per protocol, scanned into media section of EMR  Pertinent History: None    Subjective     Patient reports: No adverse reports. Everything is going well with left knee recovery. No pain, buckling, or weakness in left knee, and he has been doing more walking without knee brace.    CMS Impairment/Limitation/Restriction for FOTO Survey  Therapist reviewed FOTO scores for Zahraa Mistry on 11/1/2022.   FOTO documents entered into EPIC - see Media section.  Eval Patient's Physical FS Primary Measure: 24  Eval Risk Adjusted Statistical FOTO: 46  Eval Limitation Score: 76%  Category: Mobility  Eval LEFS: 9.4/80 = 11.75% functional      Therapist reviewed FOTO scores for Zahraa Mistry on 11/18/2022.   FOTO documents entered into EPIC - see Media section.  Eval Patient's Physical FS Primary Measure: 44  Eval Risk Adjusted Statistical FOTO: 46  Eval Limitation Score: 56%  Category: Mobility  Eval LEFS: 28.4/80 = 35.50% functional    Objective     ZAHRAA received the following treatment:     Time Activities   Manual  Left knee tibial on femoral internal rotation and posterior drawer grades 1-2 with movement    TherAct 20  min Knee flx lunge stretch, calf board (gastroc bias, hamstrings bias), step ups (fwd, lat), mini squats (w/ clamshells), monster walk, wall TKE ball squeeze, single leg stance holds (knee at 15 degree bend), high march gait     TherEx 11 min LLE (4-way hip, prone HS curl,  deg to 40 deg)     Gait  Gait with visual cueing via mirror (normal step, high march step to reduce trendelenburg)   Neuro Re-ed  Russian e-stim to VMO and vastus lateralis co-contract with (wall TKE ball squeeze, mini squat holds, single leg stance with mini squat holds)   Modalities     E-Stim     Dry Needling     Canalith Repositioning           Home Exercises Provided and Patient Education Provided     Education provided:   -Plan of care, HEP, assistive device weaning    Assessment     Continues to show significant improvement in quick time period. He continues to perform all exercises quicker, less difficulty, more fluid each session. Gait has significantly improved in fluidity; very mild hip hike but much better overall swing with left leg. Expect continued progression at fast pace and return to full activity fairly quickly.    Patient prognosis is Excellent.      Anticipated barriers to physical therapy: None    Goals: ZAHRAA Is progressing well towards his goals.  Short Term Goals: 8 weeks   Patient will report at least 15% increase in functional capacity from initial LEFS score to indicate clinically significant functional improvement (goal met)  Patient will report at least 15 point increase on initial FOTO score to indicate clinically significant functional improvement (goal met)  Patient will demonstrate full day of ambulation without assistive device (goal met)     Long Term Goals: 16 weeks   Patient will report at least 30% increase in functional capacity from initial LEFS score to indicate clinically significant functional improvement  Patient will report at least 30 point increase on initial FOTO score to indicate clinically  significant functional improvement    Plan     2-3x/week x 12 weeks    Aneta Marrero, PT

## 2022-11-30 ENCOUNTER — CLINICAL SUPPORT (OUTPATIENT)
Dept: REHABILITATION | Facility: HOSPITAL | Age: 22
End: 2022-11-30
Payer: COMMERCIAL

## 2022-11-30 DIAGNOSIS — R29.898 TRANSIENT LEFT LEG WEAKNESS: ICD-10-CM

## 2022-11-30 DIAGNOSIS — M25.462 PAIN AND SWELLING OF LEFT KNEE: Primary | ICD-10-CM

## 2022-11-30 DIAGNOSIS — M25.662 DECREASED ROM OF LEFT KNEE: ICD-10-CM

## 2022-11-30 DIAGNOSIS — M25.562 PAIN AND SWELLING OF LEFT KNEE: Primary | ICD-10-CM

## 2022-11-30 PROCEDURE — 97110 THERAPEUTIC EXERCISES: CPT

## 2022-11-30 PROCEDURE — 97530 THERAPEUTIC ACTIVITIES: CPT

## 2022-11-30 NOTE — PLAN OF CARE
Physical Therapy Treatment Note     Name: Zahraa Mistry  Clinic Number: [id number]    Therapy Diagnosis:   Encounter Diagnoses   Name Primary?    Pain and swelling of left knee Yes    Decreased ROM of left knee     Transient left leg weakness        Physician: Yariel Judge Jr., MD    Visit Date: 11/30/2022    Physician Orders: PT Eval and Treat  Medical Diagnosis from Referral: Left knee anterior cruciate ligament reconstruction with patella tendon autograft  Evaluation Date: 11/1/2022  Authorization Period Expiration: As needed  Plan of Care Expiration: 03/01/2022  Visit # / Visits authorized: 11/ As needed    Time In: 1011  Time Out: 1045  Total Billable Time: 34 minutes    Surgery: Left knee anterior cruciate ligament reconstruction with patella tendon autograft 10/27/22  Orthopedic Precautions: Per protocol, scanned into media section of EMR  Pertinent History: None    Subjective     Patient reports: No adverse reports. Everything is going well with left knee recovery. No pain, buckling, or weakness in left knee, and he has completely weaned from knee brace.    CMS Impairment/Limitation/Restriction for FOTO Survey  Therapist reviewed FOTO scores for Zahraa Mistry on 11/1/2022.   FOTO documents entered into EPIC - see Media section.  Eval Patient's Physical FS Primary Measure: 24  Eval Risk Adjusted Statistical FOTO: 46  Eval Limitation Score: 76%  Category: Mobility  Eval LEFS: 9.4/80 = 11.75% functional      Therapist reviewed FOTO scores for Zahraa Mistry on 11/18/2022.   FOTO documents entered into EPIC - see Media section.  Eval Patient's Physical FS Primary Measure: 44  Eval Risk Adjusted Statistical FOTO: 46  Eval Limitation Score: 56%  Category: Mobility  Eval LEFS: 28.4/80 = 35.50% functional    Objective     ZAHRAA received the following treatment:     Time Activities   Manual  Left knee tibial on femoral internal rotation and posterior drawer grades 1-2 with movement    TherAct 22 min Knee  flx lunge stretch, calf board (gastroc bias, hamstrings bias), step ups (fwd, lat), mini squats (w/ clamshells), monster walk, wall TKE ball squeeze, single leg stance holds (knee at 15 degree bend), high march gait     TherEx 12 min LLE (4-way hip, prone HS curl,  deg to 40 deg)     Gait  Gait with visual cueing via mirror (normal step, high march step to reduce trendelenburg)   Neuro Re-ed  Russian e-stim to VMO and vastus lateralis co-contract with (wall TKE ball squeeze, mini squat holds, single leg stance with mini squat holds)   Modalities     E-Stim     Dry Needling     Canalith Repositioning           Home Exercises Provided and Patient Education Provided     Education provided:   -Plan of care, HEP, assistive device weaning    Assessment     Continues to show significant improvement in quick time period. He continues to perform all exercises quicker, less difficulty, more fluid each session. Gait has significantly improved in fluidity; very mild hip hike but much better overall swing with left leg. Expect continued progression at fast pace and return to full activity fairly quickly.    Patient prognosis is Excellent.      Anticipated barriers to physical therapy: None    Goals: ZAHRAA Is progressing well towards his goals.  Short Term Goals: 8 weeks   Patient will report at least 15% increase in functional capacity from initial LEFS score to indicate clinically significant functional improvement (goal met)  Patient will report at least 15 point increase on initial FOTO score to indicate clinically significant functional improvement (goal met)  Patient will demonstrate full day of ambulation without assistive device (goal met)     Long Term Goals: 16 weeks   Patient will report at least 30% increase in functional capacity from initial LEFS score to indicate clinically significant functional improvement  Patient will report at least 30 point increase on initial FOTO score to indicate clinically  significant functional improvement    Plan     2-3x/week x 12 weeks    Aneta Marrero, PT

## 2022-12-02 ENCOUNTER — CLINICAL SUPPORT (OUTPATIENT)
Dept: REHABILITATION | Facility: HOSPITAL | Age: 22
End: 2022-12-02
Payer: COMMERCIAL

## 2022-12-02 DIAGNOSIS — M25.462 PAIN AND SWELLING OF LEFT KNEE: Primary | ICD-10-CM

## 2022-12-02 DIAGNOSIS — R29.898 TRANSIENT LEFT LEG WEAKNESS: ICD-10-CM

## 2022-12-02 DIAGNOSIS — M25.562 PAIN AND SWELLING OF LEFT KNEE: Primary | ICD-10-CM

## 2022-12-02 DIAGNOSIS — M25.662 DECREASED ROM OF LEFT KNEE: ICD-10-CM

## 2022-12-02 PROCEDURE — 97110 THERAPEUTIC EXERCISES: CPT

## 2022-12-02 PROCEDURE — 97530 THERAPEUTIC ACTIVITIES: CPT

## 2022-12-02 NOTE — PLAN OF CARE
Physical Therapy Treatment Note     Name: Migue Mistry  Clinic Number: [id number]    Therapy Diagnosis:   Encounter Diagnoses   Name Primary?    Pain and swelling of left knee Yes    Decreased ROM of left knee     Transient left leg weakness        Physician: Yariel Judge Jr., MD    Visit Date: 12/2/2022    Physician Orders: PT Eval and Treat  Medical Diagnosis from Referral: Left knee anterior cruciate ligament reconstruction with patella tendon autograft  Evaluation Date: 11/1/2022  Authorization Period Expiration: As needed  Plan of Care Expiration: 03/01/2022  Visit # / Visits authorized: 12/ As needed    Time In: 1010  Time Out: 1048  Total Billable Time: 38 minutes    Surgery: Left knee anterior cruciate ligament reconstruction with patella tendon autograft 10/27/22  Orthopedic Precautions: Per protocol, scanned into media section of EMR  Pertinent History: None    Subjective     Patient reports: No adverse reports. Everything is going well with left knee recovery. No pain, buckling, or weakness in left knee, and he has completely weaned from knee brace.    CMS Impairment/Limitation/Restriction for FOTO Survey  Therapist reviewed FOTO scores for Migue Mistry on 11/1/2022.   FOTO documents entered into EPIC - see Media section.  Patient's Physical FS Primary Measure: 24  Risk Adjusted Statistical FOTO: 46  Limitation Score: 76%  Category: Mobility  LEFS: 9.4/80 = 11.75% functional      Therapist reviewed FOTO scores for Migue Mistry on 11/18/2022.   FOTO documents entered into EPIC - see Media section.  Patient's Physical FS Primary Measure: 44  Risk Adjusted Statistical FOTO: 46  Limitation Score: 56%  Category: Mobility  LEFS: 28.4/80 = 35.50% functional    Therapist reviewed FOTO scores for Migue Mistry on 12/02/2022.   FOTO documents entered into EPIC - see Media section.  Patient's Physical FS Primary Measure: 52  Risk Adjusted Statistical FOTO: 46  Limitation Score: 48%  Category:  Mobility  LEFS: 38.7/80 = 48.38% functional        Objective     ZAHRAA received the following treatment:     Time Activities   Manual  Left knee tibial on femoral internal rotation and posterior drawer grades 1-2 with movement    TherAct 28 min Knee flx lunge stretch, calf board (gastroc bias, hamstrings bias), step ups (fwd, lat), mini squats (w/ clamshells), monster walk, single leg stance holds (knee at 15 degree bend), SLS with contralateral band hip abd, single leg deadlift into high knee march     TherEx 10 min LLE (4-way hip, prone HS curl,  deg to 40 deg)     Gait  Gait with visual cueing via mirror (normal step, high march step to reduce trendelenburg)   Neuro Re-ed  Russian e-stim to VMO and vastus lateralis co-contract with (wall TKE ball squeeze, mini squat holds, single leg stance with mini squat holds)   Modalities     E-Stim     Dry Needling     Canalith Repositioning           Home Exercises Provided and Patient Education Provided     Education provided:   -Plan of care, HEP, assistive device weaning    Assessment     Continues to show significant improvement in quick time period. He continues to perform all exercises quicker, less difficulty, more fluid each session. Now incorporating more activities to work knee stabilization in unilateral stance; some of the activities still seeing some knee shaking which is expected at ~5 weeks post-op. He is clearly far beyond where he should be functionally and strength-wise. Gait has significantly improved in fluidity; very mild hip hike but much better overall swing with left leg. Expect continued progression at fast pace and return to full activity fairly quickly.    Patient prognosis is Excellent.      Anticipated barriers to physical therapy: None    Goals: ZAHRAA Is progressing well towards his goals.  Short Term Goals: 8 weeks   Patient will report at least 15% increase in functional capacity from initial LEFS score to indicate clinically significant  functional improvement (goal met)  Patient will report at least 15 point increase on initial FOTO score to indicate clinically significant functional improvement (goal met)  Patient will demonstrate full day of ambulation without assistive device (goal met)     Long Term Goals: 16 weeks   Patient will report at least 30% increase in functional capacity from initial LEFS score to indicate clinically significant functional improvement (goal met)  Patient will report at least 30 point increase on initial FOTO score to indicate clinically significant functional improvement (progressing)    Plan     2-3x/week x 12 weeks    Aneta Marrero, PT

## 2022-12-05 ENCOUNTER — CLINICAL SUPPORT (OUTPATIENT)
Dept: REHABILITATION | Facility: HOSPITAL | Age: 22
End: 2022-12-05
Payer: COMMERCIAL

## 2022-12-05 DIAGNOSIS — M25.562 PAIN AND SWELLING OF LEFT KNEE: Primary | ICD-10-CM

## 2022-12-05 DIAGNOSIS — M25.462 PAIN AND SWELLING OF LEFT KNEE: Primary | ICD-10-CM

## 2022-12-05 DIAGNOSIS — M25.662 DECREASED ROM OF LEFT KNEE: ICD-10-CM

## 2022-12-05 DIAGNOSIS — R29.898 TRANSIENT LEFT LEG WEAKNESS: ICD-10-CM

## 2022-12-05 PROCEDURE — 97530 THERAPEUTIC ACTIVITIES: CPT

## 2022-12-05 PROCEDURE — 97110 THERAPEUTIC EXERCISES: CPT

## 2022-12-05 NOTE — PLAN OF CARE
Physical Therapy Treatment Note     Name: Migue Mistry  Clinic Number: [id number]    Therapy Diagnosis:   Encounter Diagnoses   Name Primary?    Pain and swelling of left knee Yes    Decreased ROM of left knee     Transient left leg weakness        Physician: Yariel Judge Jr., MD    Visit Date: 12/5/2022    Physician Orders: PT Eval and Treat  Medical Diagnosis from Referral: Left knee anterior cruciate ligament reconstruction with patella tendon autograft  Evaluation Date: 11/1/2022  Authorization Period Expiration: As needed  Plan of Care Expiration: 03/01/2022  Visit # / Visits authorized: 13/ As needed    Time In: 1011  Time Out: 1041  Total Billable Time: 30 minutes    Surgery: Left knee anterior cruciate ligament reconstruction with patella tendon autograft 10/27/22  Orthopedic Precautions: Per protocol, scanned into media section of EMR  Pertinent History: None    Subjective     Patient reports: No adverse reports. Everything is going well with left knee recovery. He notices that the swelling in the left knee continues to reduce, only some trace left in lateral portion.    CMS Impairment/Limitation/Restriction for FOTO Survey  Therapist reviewed FOTO scores for Migue Mistry on 11/1/2022.   FOTO documents entered into EPIC - see Media section.  Patient's Physical FS Primary Measure: 24  Risk Adjusted Statistical FOTO: 46  Limitation Score: 76%  Category: Mobility  LEFS: 9.4/80 = 11.75% functional      Therapist reviewed FOTO scores for Migue Mistry on 11/18/2022.   FOTO documents entered into EPIC - see Media section.  Patient's Physical FS Primary Measure: 44  Risk Adjusted Statistical FOTO: 46  Limitation Score: 56%  Category: Mobility  LEFS: 28.4/80 = 35.50% functional    Therapist reviewed FOTO scores for Migue Mistry on 12/02/2022.   FOTO documents entered into EPIC - see Media section.  Patient's Physical FS Primary Measure: 52  Risk Adjusted Statistical FOTO: 46  Limitation Score:  48%  Category: Mobility  LEFS: 38.7/80 = 48.38% functional        Objective     ZAHRAA received the following treatment:     Time Activities   Manual  Left knee tibial on femoral internal rotation and posterior drawer grades 1-2 with movement    TherAct 20 min Knee flx lunge stretch, calf board (gastroc bias, hamstrings bias), step ups (fwd, lat), mini squats (w/ clamshells), monster walk, single leg stance holds (knee at 15 degree bend), SLS with contralateral band hip abd, single leg deadlift into high knee march     TherEx 10 min LLE (4-way hip, prone HS curl,  deg to 40 deg)     Gait  Gait with visual cueing via mirror (normal step, high march step to reduce trendelenburg)   Neuro Re-ed  Russian e-stim to VMO and vastus lateralis co-contract with (wall TKE ball squeeze, mini squat holds, single leg stance with mini squat holds)   Modalities     E-Stim     Dry Needling     Canalith Repositioning           Home Exercises Provided and Patient Education Provided     Education provided:   -Plan of care, HEP, assistive device weaning    Assessment     Continues to show significant improvement in quick time period. He continues to perform all exercises quicker, less difficulty, more fluid each session. Now incorporating more activities to work knee stabilization in unilateral stance; some of the activities still seeing some knee shaking which is expected at ~5 weeks post-op however already improved from previous session. He is clearly far beyond where he should be functionally and strength-wise. Gait has now fully with no hip hike present. Expect continued progression at fast pace and return to full activity fairly quickly.    Patient prognosis is Excellent.      Anticipated barriers to physical therapy: None    Goals: ZAHRAA Is progressing well towards his goals.  Short Term Goals: 8 weeks   Patient will report at least 15% increase in functional capacity from initial LEFS score to indicate clinically significant  functional improvement (goal met)  Patient will report at least 15 point increase on initial FOTO score to indicate clinically significant functional improvement (goal met)  Patient will demonstrate full day of ambulation without assistive device (goal met)     Long Term Goals: 16 weeks   Patient will report at least 30% increase in functional capacity from initial LEFS score to indicate clinically significant functional improvement (goal met)  Patient will report at least 30 point increase on initial FOTO score to indicate clinically significant functional improvement (progressing)    Plan     2-3x/week x 12 weeks    Aneta Marrero, PT

## 2022-12-07 ENCOUNTER — CLINICAL SUPPORT (OUTPATIENT)
Dept: REHABILITATION | Facility: HOSPITAL | Age: 22
End: 2022-12-07
Payer: COMMERCIAL

## 2022-12-07 DIAGNOSIS — M25.562 PAIN AND SWELLING OF LEFT KNEE: Primary | ICD-10-CM

## 2022-12-07 DIAGNOSIS — R29.898 TRANSIENT LEFT LEG WEAKNESS: ICD-10-CM

## 2022-12-07 DIAGNOSIS — M25.462 PAIN AND SWELLING OF LEFT KNEE: Primary | ICD-10-CM

## 2022-12-07 DIAGNOSIS — M25.662 DECREASED ROM OF LEFT KNEE: ICD-10-CM

## 2022-12-07 PROCEDURE — 97530 THERAPEUTIC ACTIVITIES: CPT

## 2022-12-07 PROCEDURE — 97110 THERAPEUTIC EXERCISES: CPT

## 2022-12-07 NOTE — PLAN OF CARE
Physical Therapy Treatment Note     Name: Migue Mistry  Clinic Number: [id number]    Therapy Diagnosis:   Encounter Diagnoses   Name Primary?    Pain and swelling of left knee Yes    Decreased ROM of left knee     Transient left leg weakness        Physician: Yariel Judge Jr., MD    Visit Date: 12/7/2022    Physician Orders: PT Eval and Treat  Medical Diagnosis from Referral: Left knee anterior cruciate ligament reconstruction with patella tendon autograft  Evaluation Date: 11/1/2022  Authorization Period Expiration: As needed  Plan of Care Expiration: 03/01/2022  Visit # / Visits authorized: 14/ As needed    Time In: 1013  Time Out: 1048  Total Billable Time: 35 minutes    Surgery: Left knee anterior cruciate ligament reconstruction with patella tendon autograft 10/27/22  Orthopedic Precautions: Per protocol, scanned into media section of EMR  Pertinent History: None    Subjective     Patient reports: No adverse reports. Everything is going well with left knee recovery. Got a bit of a workout this morning, there was a goat in the yard and his dogs were riled up so he had to take care of the situation.    CMS Impairment/Limitation/Restriction for FOTO Survey  Therapist reviewed FOTO scores for Migue Mistry on 11/1/2022.   FOTO documents entered into EPIC - see Media section.  Patient's Physical FS Primary Measure: 24  Risk Adjusted Statistical FOTO: 46  Limitation Score: 76%  Category: Mobility  LEFS: 9.4/80 = 11.75% functional      Therapist reviewed FOTO scores for Migue Mistry on 11/18/2022.   FOTO documents entered into EPIC - see Media section.  Patient's Physical FS Primary Measure: 44  Risk Adjusted Statistical FOTO: 46  Limitation Score: 56%  Category: Mobility  LEFS: 28.4/80 = 35.50% functional    Therapist reviewed FOTO scores for Migue Mistry on 12/02/2022.   FOTO documents entered into EPIC - see Media section.  Patient's Physical FS Primary Measure: 52  Risk Adjusted Statistical  FOTO: 46  Limitation Score: 48%  Category: Mobility  LEFS: 38.7/80 = 48.38% functional        Objective     ZAHRAA received the following treatment:     Time Activities   Manual  Left knee tibial on femoral internal rotation and posterior drawer grades 1-2 with movement    TherAct 25 min Knee flx lunge stretch, calf board (gastroc bias, hamstrings bias), step ups (fwd, lat), mini squats (w/ clamshells), monster walk, single leg stance holds (knee at 15 degree bend), SLS with contralateral band hip abd, single leg deadlift into high knee march, BOSU step into lunge (fwd, lat)     TherEx 10 min LLE (4-way hip, prone HS curl,  deg to 40 deg)     Gait  Gait with visual cueing via mirror (normal step, high march step to reduce trendelenburg)   Neuro Re-ed  Russian e-stim to VMO and vastus lateralis co-contract with (wall TKE ball squeeze, mini squat holds, single leg stance with mini squat holds)   Modalities     E-Stim     Dry Needling     Canalith Repositioning           Home Exercises Provided and Patient Education Provided     Education provided:   -Plan of care, HEP, assistive device weaning    Assessment     Continues to show significant improvement in quick time period. He continues to perform all exercises quicker, less difficulty, more fluid each session. Now incorporating more activities to work knee stabilization in unilateral stance; some of the activities still seeing some knee shaking which is expected at ~5 weeks post-op however already improved from previous session. He is clearly far beyond where he should be functionally and strength-wise. Gait has now fully with no hip hike present. Expect continued progression at fast pace and return to full activity fairly quickly.    Patient prognosis is Excellent.      Anticipated barriers to physical therapy: None    Goals: ZAHRAA Is progressing well towards his goals.  Short Term Goals: 8 weeks   Patient will report at least 15% increase in functional capacity  from initial LEFS score to indicate clinically significant functional improvement (goal met)  Patient will report at least 15 point increase on initial FOTO score to indicate clinically significant functional improvement (goal met)  Patient will demonstrate full day of ambulation without assistive device (goal met)     Long Term Goals: 16 weeks   Patient will report at least 30% increase in functional capacity from initial LEFS score to indicate clinically significant functional improvement (goal met)  Patient will report at least 30 point increase on initial FOTO score to indicate clinically significant functional improvement (progressing)    Plan     2-3x/week x 12 weeks    Aneta Marrero, PT

## 2022-12-09 ENCOUNTER — CLINICAL SUPPORT (OUTPATIENT)
Dept: REHABILITATION | Facility: HOSPITAL | Age: 22
End: 2022-12-09
Payer: COMMERCIAL

## 2022-12-09 DIAGNOSIS — M25.662 DECREASED ROM OF LEFT KNEE: ICD-10-CM

## 2022-12-09 DIAGNOSIS — M25.562 PAIN AND SWELLING OF LEFT KNEE: Primary | ICD-10-CM

## 2022-12-09 DIAGNOSIS — R29.898 TRANSIENT LEFT LEG WEAKNESS: ICD-10-CM

## 2022-12-09 DIAGNOSIS — M25.462 PAIN AND SWELLING OF LEFT KNEE: Primary | ICD-10-CM

## 2022-12-09 PROCEDURE — 97530 THERAPEUTIC ACTIVITIES: CPT

## 2022-12-09 PROCEDURE — 97110 THERAPEUTIC EXERCISES: CPT

## 2022-12-09 NOTE — PLAN OF CARE
Physical Therapy Treatment Note     Name: Zahraa Mistry  Clinic Number: [id number]    Therapy Diagnosis:   Encounter Diagnoses   Name Primary?    Pain and swelling of left knee Yes    Decreased ROM of left knee     Transient left leg weakness        Physician: Yariel Judge Jr., MD    Visit Date: 12/9/2022    Physician Orders: PT Eval and Treat  Medical Diagnosis from Referral: Left knee anterior cruciate ligament reconstruction with patella tendon autograft  Evaluation Date: 11/1/2022  Authorization Period Expiration: As needed  Plan of Care Expiration: 03/01/2022  Visit # / Visits authorized: 15/ As needed    Time In: 1010  Time Out: 1048  Total Billable Time: 38 minutes    Surgery: Left knee anterior cruciate ligament reconstruction with patella tendon autograft 10/27/22  Orthopedic Precautions: Per protocol, scanned into media section of EMR  Pertinent History: None    Subjective     Patient reports: No adverse reports. Everything is going well with left knee recovery.    CMS Impairment/Limitation/Restriction for FOTO Survey  Therapist reviewed FOTO scores for Zahraa Mistry on 11/1/2022.   FOTO documents entered into EPIC - see Media section.  Patient's Physical FS Primary Measure: 24  Risk Adjusted Statistical FOTO: 46  Limitation Score: 76%  Category: Mobility  LEFS: 9.4/80 = 11.75% functional      Therapist reviewed FOTO scores for Zahraa Mistry on 11/18/2022.   FOTO documents entered into EPIC - see Media section.  Patient's Physical FS Primary Measure: 44  Risk Adjusted Statistical FOTO: 46  Limitation Score: 56%  Category: Mobility  LEFS: 28.4/80 = 35.50% functional    Therapist reviewed FOTO scores for Zahraa Mistry on 12/02/2022.   FOTO documents entered into EPIC - see Media section.  Patient's Physical FS Primary Measure: 52  Risk Adjusted Statistical FOTO: 46  Limitation Score: 48%  Category: Mobility  LEFS: 38.7/80 = 48.38% functional        Objective     ZAHRAA received the following  treatment:     Time Activities   Manual  Left knee tibial on femoral internal rotation and posterior drawer grades 1-2 with movement    TherAct 29 min Knee flx lunge stretch, calf board (gastroc bias, hamstrings bias), step ups (fwd, lat), mini squats (w/ clamshells), monster walk, single leg stance holds (knee at 15 degree bend), SLS with contralateral band hip abd, single leg deadlift into high knee march, BOSU step into lunge (fwd, lat), elbow plank (with toe taps), eccentric step down (fwd, lat)     TherEx 9 min LLE (4-way hip, prone HS curl,  deg to 40 deg)     Gait  Gait with visual cueing via mirror (normal step, high march step to reduce trendelenburg)   Neuro Re-ed  Russian e-stim to VMO and vastus lateralis co-contract with (wall TKE ball squeeze, mini squat holds, single leg stance with mini squat holds)   Modalities     E-Stim     Dry Needling     Canalith Repositioning           Home Exercises Provided and Patient Education Provided     Education provided:   -Plan of care, HEP, assistive device weaning    Assessment     Continues to show significant improvement in quick time period. He continues to perform all exercises quicker, less difficulty, more fluid each session. Now incorporating more activities to work knee stabilization in unilateral stance; some of the activities still seeing some knee shaking which is expected at ~5 weeks post-op however already improved from previous session. He is clearly far beyond where he should be functionally and strength-wise. Gait has now fully with no hip hike present. Expect continued progression at fast pace and return to full activity fairly quickly.    Patient prognosis is Excellent.      Anticipated barriers to physical therapy: None    Goals: ZAHRAA Is progressing well towards his goals.  Short Term Goals: 8 weeks   Patient will report at least 15% increase in functional capacity from initial LEFS score to indicate clinically significant functional  improvement (goal met)  Patient will report at least 15 point increase on initial FOTO score to indicate clinically significant functional improvement (goal met)  Patient will demonstrate full day of ambulation without assistive device (goal met)     Long Term Goals: 16 weeks   Patient will report at least 30% increase in functional capacity from initial LEFS score to indicate clinically significant functional improvement (goal met)  Patient will report at least 30 point increase on initial FOTO score to indicate clinically significant functional improvement (progressing)    Plan     2-3x/week x 12 weeks    Aneta Marrero, PT

## 2022-12-12 ENCOUNTER — OFFICE VISIT (OUTPATIENT)
Dept: ORTHOPEDICS | Facility: CLINIC | Age: 22
End: 2022-12-12
Payer: COMMERCIAL

## 2022-12-12 ENCOUNTER — CLINICAL SUPPORT (OUTPATIENT)
Dept: REHABILITATION | Facility: HOSPITAL | Age: 22
End: 2022-12-12
Payer: COMMERCIAL

## 2022-12-12 ENCOUNTER — HOSPITAL ENCOUNTER (OUTPATIENT)
Dept: RADIOLOGY | Facility: CLINIC | Age: 22
Discharge: HOME | End: 2022-12-12
Attending: ORTHOPAEDIC SURGERY
Payer: COMMERCIAL

## 2022-12-12 DIAGNOSIS — Z98.890 S/P ACL RECONSTRUCTION: Primary | ICD-10-CM

## 2022-12-12 DIAGNOSIS — R29.898 TRANSIENT LEFT LEG WEAKNESS: ICD-10-CM

## 2022-12-12 DIAGNOSIS — Z98.890 S/P ACL RECONSTRUCTION: ICD-10-CM

## 2022-12-12 DIAGNOSIS — M25.562 PAIN AND SWELLING OF LEFT KNEE: Primary | ICD-10-CM

## 2022-12-12 DIAGNOSIS — M25.662 DECREASED ROM OF LEFT KNEE: ICD-10-CM

## 2022-12-12 DIAGNOSIS — M25.462 PAIN AND SWELLING OF LEFT KNEE: Primary | ICD-10-CM

## 2022-12-12 PROCEDURE — 1159F PR MEDICATION LIST DOCUMENTED IN MEDICAL RECORD: ICD-10-PCS | Mod: CPTII,,, | Performed by: ORTHOPAEDIC SURGERY

## 2022-12-12 PROCEDURE — 99024 POSTOP FOLLOW-UP VISIT: CPT | Mod: ,,, | Performed by: ORTHOPAEDIC SURGERY

## 2022-12-12 PROCEDURE — 1159F MED LIST DOCD IN RCRD: CPT | Mod: CPTII,,, | Performed by: ORTHOPAEDIC SURGERY

## 2022-12-12 PROCEDURE — 99024 PR POST-OP FOLLOW-UP VISIT: ICD-10-PCS | Mod: ,,, | Performed by: ORTHOPAEDIC SURGERY

## 2022-12-12 PROCEDURE — 97530 THERAPEUTIC ACTIVITIES: CPT

## 2022-12-12 PROCEDURE — 73564 XR KNEE COMP 4 OR MORE VIEWS LEFT: ICD-10-PCS | Mod: LT,,, | Performed by: ORTHOPAEDIC SURGERY

## 2022-12-12 PROCEDURE — 73564 X-RAY EXAM KNEE 4 OR MORE: CPT | Mod: LT,,, | Performed by: ORTHOPAEDIC SURGERY

## 2022-12-12 NOTE — PROGRESS NOTES
Chief Complaint:   Chief Complaint   Patient presents with    Left Knee - Post-op Evaluation    Post-op Evaluation     7 wk sp left knee ACL recon sx 10/27/22 GL 1/25/23, patient presents today with no complaints and states PT is going well, unable to obtain vitals       History of present illness:  10/27/2022: Left ACL recon with patella tendon autograft    He returns today.  His pain is under good control.  He is working in therapy    Musculoskeletal:   Incisions healed.  Range of motion 0-120 degrees    Imaging:  Three views left knee show appropriate implant position.       Assessment: S/P ACL reconstruction        Plan:  Doing well status post above.  Continue rehab.  I will see him back in 6 weeks for a ROM check.

## 2022-12-12 NOTE — PLAN OF CARE
Physical Therapy Treatment Note     Name: Zahraa Mistry  Clinic Number: [id number]    Therapy Diagnosis:   Encounter Diagnoses   Name Primary?    Pain and swelling of left knee Yes    Decreased ROM of left knee     Transient left leg weakness        Physician: Yariel Judge Jr., MD    Visit Date: 12/12/2022    Physician Orders: PT Eval and Treat  Medical Diagnosis from Referral: Left knee anterior cruciate ligament reconstruction with patella tendon autograft  Evaluation Date: 11/1/2022  Authorization Period Expiration: As needed  Plan of Care Expiration: 03/01/2022  Visit # / Visits authorized: 16/ As needed    Time In: 1013  Time Out: 1045  Total Billable Time: 32 minutes    Surgery: Left knee anterior cruciate ligament reconstruction with patella tendon autograft 10/27/22  Orthopedic Precautions: Per protocol, scanned into media section of EMR  Pertinent History: None    Subjective     Patient reports: No adverse reports. Had follow up with surgeon this morning and everything went well.    CMS Impairment/Limitation/Restriction for FOTO Survey  Therapist reviewed FOTO scores for Zahraa Mistry on 11/1/2022.   FOTO documents entered into EPIC - see Media section.  Patient's Physical FS Primary Measure: 24  Risk Adjusted Statistical FOTO: 46  Limitation Score: 76%  Category: Mobility  LEFS: 9.4/80 = 11.75% functional      Therapist reviewed FOTO scores for Zahraa Mistry on 11/18/2022.   FOTO documents entered into EPIC - see Media section.  Patient's Physical FS Primary Measure: 44  Risk Adjusted Statistical FOTO: 46  Limitation Score: 56%  Category: Mobility  LEFS: 28.4/80 = 35.50% functional    Therapist reviewed FOTO scores for Zahraa Mistry on 12/02/2022.   FOTO documents entered into EPIC - see Media section.  Patient's Physical FS Primary Measure: 52  Risk Adjusted Statistical FOTO: 46  Limitation Score: 48%  Category: Mobility  LEFS: 38.7/80 = 48.38% functional        Objective     ZAHRAA  received the following treatment:     Time Activities   Manual  Left knee tibial on femoral internal rotation and posterior drawer grades 1-2 with movement    TherAct 25 min Knee flx lunge stretch, calf board (gastroc bias, hamstrings bias), step ups (fwd, lat), mini squats (w/ clamshells), monster walk, single leg stance holds (knee at 15 degree bend), SLS with contralateral band hip abd, single leg deadlift into high knee march, BOSU step into lunge (fwd, lat), elbow plank (with toe taps), eccentric step down (fwd, lat)     TherEx 7 min LLE (4-way hip, prone HS curl,  deg to 40 deg)     Gait  Gait with visual cueing via mirror (normal step, high march step to reduce trendelenburg)   Neuro Re-ed  Russian e-stim to VMO and vastus lateralis co-contract with (wall TKE ball squeeze, mini squat holds, single leg stance with mini squat holds)   Modalities     E-Stim     Dry Needling     Canalith Repositioning           Home Exercises Provided and Patient Education Provided     Education provided:   -Plan of care, HEP, assistive device weaning    Assessment     Continues to show significant improvement in quick time period. He continues to perform all exercises quicker, less difficulty, more fluid each session. Now incorporating more activities to work knee stabilization in unilateral stance; some of the activities still seeing some knee shaking which is expected at ~5 weeks post-op however already improved from previous session. He is clearly far beyond where he should be functionally and strength-wise. Gait has now fully with no hip hike present. Expect continued progression at fast pace and return to full activity fairly quickly.    Patient prognosis is Excellent.      Anticipated barriers to physical therapy: None    Goals: ZAHRAA Is progressing well towards his goals.  Short Term Goals: 8 weeks   Patient will report at least 15% increase in functional capacity from initial LEFS score to indicate clinically  significant functional improvement (goal met)  Patient will report at least 15 point increase on initial FOTO score to indicate clinically significant functional improvement (goal met)  Patient will demonstrate full day of ambulation without assistive device (goal met)     Long Term Goals: 16 weeks   Patient will report at least 30% increase in functional capacity from initial LEFS score to indicate clinically significant functional improvement (goal met)  Patient will report at least 30 point increase on initial FOTO score to indicate clinically significant functional improvement (progressing)    Plan     2-3x/week x 12 weeks    Aneta Marrero, PT

## 2022-12-14 ENCOUNTER — CLINICAL SUPPORT (OUTPATIENT)
Dept: REHABILITATION | Facility: HOSPITAL | Age: 22
End: 2022-12-14
Payer: COMMERCIAL

## 2022-12-14 DIAGNOSIS — R29.898 TRANSIENT LEFT LEG WEAKNESS: Primary | ICD-10-CM

## 2022-12-14 DIAGNOSIS — Z74.09 IMPAIRED FUNCTIONAL MOBILITY, BALANCE, AND ENDURANCE: ICD-10-CM

## 2022-12-14 PROCEDURE — 97530 THERAPEUTIC ACTIVITIES: CPT

## 2022-12-14 NOTE — PLAN OF CARE
Physical Therapy Treatment Note     Name: Zahraa Mistry  Clinic Number: [id number]    Therapy Diagnosis:   Encounter Diagnoses   Name Primary?    Transient left leg weakness Yes    Impaired functional mobility, balance, and endurance        Physician: Yariel Judge Jr., MD    Visit Date: 12/14/2022    Physician Orders: PT Eval and Treat  Medical Diagnosis from Referral: Left knee anterior cruciate ligament reconstruction with patella tendon autograft  Evaluation Date: 11/1/2022  Authorization Period Expiration: As needed  Plan of Care Expiration: 03/01/2022  Visit # / Visits authorized: 17/ As needed    Time In: 1013  Time Out: 1053  Total Billable Time: 40 minutes    Surgery: Left knee anterior cruciate ligament reconstruction with patella tendon autograft 10/27/22  Orthopedic Precautions: Per protocol, scanned into media section of EMR  Pertinent History: None    Subjective     Patient reports: No adverse reports. Had follow up with surgeon this morning and everything went well.    CMS Impairment/Limitation/Restriction for FOTO Survey  Therapist reviewed FOTO scores for Zahraa Mistry on 11/1/2022.   FOTO documents entered into EPIC - see Media section.  Patient's Physical FS Primary Measure: 24  Risk Adjusted Statistical FOTO: 46  Limitation Score: 76%  Category: Mobility  LEFS: 9.4/80 = 11.75% functional      Therapist reviewed FOTO scores for Zahraa Mistry on 11/18/2022.   FOTO documents entered into EPIC - see Media section.  Patient's Physical FS Primary Measure: 44  Risk Adjusted Statistical FOTO: 46  Limitation Score: 56%  Category: Mobility  LEFS: 28.4/80 = 35.50% functional    Therapist reviewed FOTO scores for Zahraa Mistry on 12/02/2022.   FOTO documents entered into EPIC - see Media section.  Patient's Physical FS Primary Measure: 52  Risk Adjusted Statistical FOTO: 46  Limitation Score: 48%  Category: Mobility  LEFS: 38.7/80 = 48.38% functional        Objective     ZAHRAA received the  following treatment:     Time Activities   Manual  Left knee tibial on femoral internal rotation and posterior drawer grades 1-2 with movement    TherAct 40 min NuStep (low level, high level), bridge with HS curl, knee flx lunge stretch, calf board (gastroc bias, hamstrings bias), step ups (fwd, lat), mini squats (w/ clamshells), monster walk, single leg stance holds (knee at 15 degree bend), SLS with contralateral band hip abd, single leg deadlift into high knee march, BOSU step into lunge (fwd, lat), plank on hands (with hand taps, with toe taps, with hip ext), eccentric step down (fwd, lat)     TherEx       Gait  Gait with visual cueing via mirror (normal step, high march step to reduce trendelenburg)   Neuro Re-ed  Russian e-stim to VMO and vastus lateralis co-contract with (wall TKE ball squeeze, mini squat holds, single leg stance with mini squat holds)   Modalities     E-Stim     Dry Needling     Canalith Repositioning           Home Exercises Provided and Patient Education Provided     Education provided:   -Plan of care, HEP, assistive device weaning    Assessment     Continues to show significant improvement in quick time period. He continues to perform all exercises quicker, less difficulty, more fluid each session. Now incorporating more activities to work knee stabilization in unilateral stance; some of the activities still seeing some knee shaking which is expected at ~5 weeks post-op however already improved from previous session. He is clearly far beyond where he should be functionally and strength-wise. Gait has now fully with no hip hike present. Expect continued progression at fast pace and return to full activity fairly quickly.    Patient prognosis is Excellent.      Anticipated barriers to physical therapy: None    Goals: ZAHRAA Is progressing well towards his goals.  Short Term Goals: 8 weeks   Patient will report at least 15% increase in functional capacity from initial LEFS score to indicate  clinically significant functional improvement (goal met)  Patient will report at least 15 point increase on initial FOTO score to indicate clinically significant functional improvement (goal met)  Patient will demonstrate full day of ambulation without assistive device (goal met)     Long Term Goals: 16 weeks   Patient will report at least 30% increase in functional capacity from initial LEFS score to indicate clinically significant functional improvement (goal met)  Patient will report at least 30 point increase on initial FOTO score to indicate clinically significant functional improvement (progressing)    Plan     2-3x/week x 12 weeks    Aneta Marrero, PT

## 2022-12-16 ENCOUNTER — CLINICAL SUPPORT (OUTPATIENT)
Dept: REHABILITATION | Facility: HOSPITAL | Age: 22
End: 2022-12-16
Payer: COMMERCIAL

## 2022-12-16 DIAGNOSIS — R29.898 TRANSIENT LEFT LEG WEAKNESS: ICD-10-CM

## 2022-12-16 DIAGNOSIS — Z74.09 IMPAIRED FUNCTIONAL MOBILITY, BALANCE, AND ENDURANCE: Primary | ICD-10-CM

## 2022-12-16 PROCEDURE — 97530 THERAPEUTIC ACTIVITIES: CPT

## 2022-12-16 NOTE — PLAN OF CARE
Physical Therapy Treatment Note     Name: Migue Mistry  Clinic Number: [id number]    Therapy Diagnosis:   Encounter Diagnoses   Name Primary?    Impaired functional mobility, balance, and endurance Yes    Transient left leg weakness        Physician: Yariel Judge Jr., MD    Visit Date: 12/16/2022    Physician Orders: PT Eval and Treat  Medical Diagnosis from Referral: Left knee anterior cruciate ligament reconstruction with patella tendon autograft  Evaluation Date: 11/1/2022  Authorization Period Expiration: As needed  Plan of Care Expiration: 03/01/2022  Visit # / Visits authorized: 18/ As needed    Time In: 1010  Time Out: 1048  Total Billable Time: 38 minutes    Surgery: Left knee anterior cruciate ligament reconstruction with patella tendon autograft 10/27/22  Orthopedic Precautions: Per protocol, scanned into media section of EMR  Pertinent History: None    Subjective     Patient reports: No adverse reports. All is going well.    CMS Impairment/Limitation/Restriction for FOTO Survey  Therapist reviewed FOTO scores for Migue Mistry on 11/1/2022.   FOTO documents entered into EPIC - see Media section.  Patient's Physical FS Primary Measure: 24  Risk Adjusted Statistical FOTO: 46  Limitation Score: 76%  Category: Mobility  LEFS: 9.4/80 = 11.75% functional      Therapist reviewed FOTO scores for Migue Mistry on 11/18/2022.   FOTO documents entered into EPIC - see Media section.  Patient's Physical FS Primary Measure: 44  Risk Adjusted Statistical FOTO: 46  Limitation Score: 56%  Category: Mobility  LEFS: 28.4/80 = 35.50% functional    Therapist reviewed FOTO scores for Migue Mistry on 12/02/2022.   FOTO documents entered into EPIC - see Media section.  Patient's Physical FS Primary Measure: 52  Risk Adjusted Statistical FOTO: 46  Limitation Score: 48%  Category: Mobility  LEFS: 38.7/80 = 48.38% functional    Therapist reviewed FOTO scores for Migue Mistry on 12/16/2022.   FOTO documents  entered into EPIC - see Media section.  Patient's Physical FS Primary Measure: 59  Risk Adjusted Statistical FOTO: 46  Limitation Score: 41%  Category: Mobility  LEFS: 48.2/80 = 60.25% functional    Objective     ZAHRAA received the following treatment:     Time Activities   Manual  Left knee tibial on femoral internal rotation and posterior drawer grades 1-2 with movement    TherAct 38 min NuStep (low level, high level), bridge with HS curl, knee flx lunge stretch, calf board (gastroc bias, hamstrings bias), step ups (fwd, lat), mini squats (w/ clamshells), monster walk, single leg stance holds (knee at 15 degree bend), SLS with contralateral band hip abd, single leg deadlift into high knee march, BOSU step into lunge (fwd, lat), plank on hands (with hand taps, with toe taps, with hip ext), eccentric step down (fwd, lat)     TherEx       Gait  Gait with visual cueing via mirror (normal step, high march step to reduce trendelenburg)   Neuro Re-ed  Russian e-stim to VMO and vastus lateralis co-contract with (wall TKE ball squeeze, mini squat holds, single leg stance with mini squat holds)   Modalities     E-Stim     Dry Needling     Canalith Repositioning           Home Exercises Provided and Patient Education Provided     Education provided:   -Plan of care, HEP, assistive device weaning    Assessment     Continues to show significant improvement in quick time period. He continues to perform all exercises quicker, less difficulty, more fluid each session. Now incorporating more activities to work knee stabilization in unilateral stance; some of the activities still seeing some knee shaking which is expected at ~5 weeks post-op however already improved from previous session. He is clearly far beyond where he should be functionally and strength-wise. Gait has now fully with no hip hike present. Expect continued progression at fast pace and return to full activity fairly quickly.    Patient prognosis is Excellent.       Anticipated barriers to physical therapy: None    Goals: ZAHRAA Is progressing well towards his goals.  Short Term Goals: 8 weeks   Patient will report at least 15% increase in functional capacity from initial LEFS score to indicate clinically significant functional improvement (goal met)  Patient will report at least 15 point increase on initial FOTO score to indicate clinically significant functional improvement (goal met)  Patient will demonstrate full day of ambulation without assistive device (goal met)     Long Term Goals: 16 weeks   Patient will report at least 30% increase in functional capacity from initial LEFS score to indicate clinically significant functional improvement (goal met)  Patient will report at least 30 point increase on initial FOTO score to indicate clinically significant functional improvement (goal met)  Patient will jog/run for 15 minutes without left knee pain    Plan     2-3x/week x 12 weeks    Aneta Marrero, PT

## 2022-12-19 ENCOUNTER — CLINICAL SUPPORT (OUTPATIENT)
Dept: REHABILITATION | Facility: HOSPITAL | Age: 22
End: 2022-12-19
Payer: COMMERCIAL

## 2022-12-19 PROCEDURE — 97110 THERAPEUTIC EXERCISES: CPT

## 2022-12-19 NOTE — PLAN OF CARE
Physical Therapy Treatment Note     Name: Migue Mistry  Clinic Number: [id number]    Therapy Diagnosis:   No diagnosis found.      Physician: Yariel Judge Jr., MD    Visit Date: 12/19/2022    Physician Orders: PT Eval and Treat  Medical Diagnosis from Referral: Left knee anterior cruciate ligament reconstruction with patella tendon autograft  Evaluation Date: 11/1/2022  Authorization Period Expiration: As needed  Plan of Care Expiration: 03/01/2022  Visit # / Visits authorized: 18/ As needed    Time In: 1015  Time Out: 1055  Total Billable Time: 40 minutes    Surgery: Left knee anterior cruciate ligament reconstruction with patella tendon autograft 10/27/22  Orthopedic Precautions: Per protocol, scanned into media section of EMR  Pertinent History: None    Subjective     Patient reports: No adverse reports. All is going well.    CMS Impairment/Limitation/Restriction for FOTO Survey  Therapist reviewed FOTO scores for Migue Mistry on 11/1/2022.   FOTO documents entered into EPIC - see Media section.  Patient's Physical FS Primary Measure: 24  Risk Adjusted Statistical FOTO: 46  Limitation Score: 76%  Category: Mobility  LEFS: 9.4/80 = 11.75% functional      Therapist reviewed FOTO scores for Migue Mistry on 11/18/2022.   FOTO documents entered into EPIC - see Media section.  Patient's Physical FS Primary Measure: 44  Risk Adjusted Statistical FOTO: 46  Limitation Score: 56%  Category: Mobility  LEFS: 28.4/80 = 35.50% functional    Therapist reviewed FOTO scores for Migue Mistry on 12/02/2022.   FOTO documents entered into EPIC - see Media section.  Patient's Physical FS Primary Measure: 52  Risk Adjusted Statistical FOTO: 46  Limitation Score: 48%  Category: Mobility  LEFS: 38.7/80 = 48.38% functional    Therapist reviewed FOTO scores for Migue Mistry on 12/16/2022.   FOTO documents entered into EPIC - see Media section.  Patient's Physical FS Primary Measure: 59  Risk Adjusted Statistical  FOTO: 46  Limitation Score: 41%  Category: Mobility  LEFS: 48.2/80 = 60.25% functional    Objective     ZAHRAA received the following treatment:     Time Activities   Manual  Left knee tibial on femoral internal rotation and posterior drawer grades 1-2 with movement    TherAct 40 min NuStep (low level, high level), bridge with HS curl, knee flx lunge stretch, calf board (gastroc bias, hamstrings bias), step ups (fwd, lat), mini squats (w/ clamshells), monster walk, single leg stance holds (knee at 15 degree bend), SLS with contralateral band hip abd, single leg deadlift into high knee march, BOSU step into lunge (fwd, lat), plank on hands (with hand taps, with toe taps, with hip ext), eccentric step down (fwd, lat)     TherEx       Gait  Gait with visual cueing via mirror (normal step, high march step to reduce trendelenburg)   Neuro Re-ed  Russian e-stim to VMO and vastus lateralis co-contract with (wall TKE ball squeeze, mini squat holds, single leg stance with mini squat holds)   Modalities     E-Stim     Dry Needling     Canalith Repositioning           Home Exercises Provided and Patient Education Provided     Education provided:   -Plan of care, HEP, assistive device weaning    Assessment     Pt tolerated treatment well with no pain and fairly normal gait. He will benefit from continued strengthening and proprioceptive training to return to PLOF and to reduce risk of injury.     Patient prognosis is Excellent.      Anticipated barriers to physical therapy: None    Goals: ZAHRAA Is progressing well towards his goals.  Short Term Goals: 8 weeks   Patient will report at least 15% increase in functional capacity from initial LEFS score to indicate clinically significant functional improvement (goal met)  Patient will report at least 15 point increase on initial FOTO score to indicate clinically significant functional improvement (goal met)  Patient will demonstrate full day of ambulation without assistive device (goal  met)     Long Term Goals: 16 weeks   Patient will report at least 30% increase in functional capacity from initial LEFS score to indicate clinically significant functional improvement (goal met)  Patient will report at least 30 point increase on initial FOTO score to indicate clinically significant functional improvement (goal met)  Patient will jog/run for 15 minutes without left knee pain    Plan     2-3x/week x 12 weeks    Adrianna Thompson, PT

## 2022-12-21 ENCOUNTER — CLINICAL SUPPORT (OUTPATIENT)
Dept: REHABILITATION | Facility: HOSPITAL | Age: 22
End: 2022-12-21
Payer: COMMERCIAL

## 2022-12-21 PROCEDURE — 97530 THERAPEUTIC ACTIVITIES: CPT

## 2022-12-21 PROCEDURE — 97110 THERAPEUTIC EXERCISES: CPT

## 2022-12-21 NOTE — PLAN OF CARE
Physical Therapy Treatment Note     Name: Migue Mistry  Clinic Number: [id number]    Therapy Diagnosis:   No diagnosis found.      Physician: Yariel Judge Jr., MD    Visit Date: 12/21/2022    Physician Orders: PT Eval and Treat  Medical Diagnosis from Referral: Left knee anterior cruciate ligament reconstruction with patella tendon autograft  Evaluation Date: 11/1/2022  Authorization Period Expiration: As needed  Plan of Care Expiration: 03/01/2022  Visit # / Visits authorized: 18/ As needed    Time In: 1015  Time Out: 1055  Total Billable Time: 40 minutes    Surgery: Left knee anterior cruciate ligament reconstruction with patella tendon autograft 10/27/22  Orthopedic Precautions: Per protocol, scanned into media section of EMR  Pertinent History: None    Subjective     Patient reports: No adverse reports. All is going well.    CMS Impairment/Limitation/Restriction for FOTO Survey  Therapist reviewed FOTO scores for Migue Mistry on 11/1/2022.   FOTO documents entered into EPIC - see Media section.  Patient's Physical FS Primary Measure: 24  Risk Adjusted Statistical FOTO: 46  Limitation Score: 76%  Category: Mobility  LEFS: 9.4/80 = 11.75% functional      Therapist reviewed FOTO scores for Migue Mistry on 11/18/2022.   FOTO documents entered into EPIC - see Media section.  Patient's Physical FS Primary Measure: 44  Risk Adjusted Statistical FOTO: 46  Limitation Score: 56%  Category: Mobility  LEFS: 28.4/80 = 35.50% functional    Therapist reviewed FOTO scores for Migue Mistry on 12/02/2022.   FOTO documents entered into EPIC - see Media section.  Patient's Physical FS Primary Measure: 52  Risk Adjusted Statistical FOTO: 46  Limitation Score: 48%  Category: Mobility  LEFS: 38.7/80 = 48.38% functional    Therapist reviewed FOTO scores for Migue Mistry on 12/16/2022.   FOTO documents entered into EPIC - see Media section.  Patient's Physical FS Primary Measure: 59  Risk Adjusted Statistical  FOTO: 46  Limitation Score: 41%  Category: Mobility  LEFS: 48.2/80 = 60.25% functional    Objective     ZAHRAA received the following treatment:     Time Activities   Manual  Left knee tibial on femoral internal rotation and posterior drawer grades 1-2 with movement    TherAct                        15 min NuStep (low level, high level), bridge with HS curl, knee flx lunge stretch, calf board (gastroc bias, hamstrings bias), step ups (fwd, lat), mini squats (w/ clamshells), monster walk, single leg stance holds (knee at 15 degree bend), SLS with contralateral band hip abd, single leg deadlift into high knee march, BOSU step into lunge (fwd, lat), plank on hands (with hand taps, with toe taps, with hip ext), eccentric step down (fwd, lat)    Dynamic balance and proprioceptive training with and without foam     TherEx 25 min There ex per flow sheet   Gait  Gait with visual cueing via mirror (normal step, high march step to reduce trendelenburg)   Neuro Re-ed  Russian e-stim to VMO and vastus lateralis co-contract with (wall TKE ball squeeze, mini squat holds, single leg stance with mini squat holds)   Modalities     E-Stim     Dry Needling     Canalith Repositioning           Home Exercises Provided and Patient Education Provided     Education provided:   -Plan of care, HEP, assistive device weaning    Assessment     Pt tolerated treatment well with no pain and normal gait. He does have occasional discomfort at patella tendon with prolonged standing activities which resolves with rest but this is common with his particular surgery. He will benefit from continued strengthening and proprioceptive training to return to PLOF and to reduce risk of injury.     Patient prognosis is Excellent.      Anticipated barriers to physical therapy: None    Goals: ZAHRAA Is progressing well towards his goals.  Short Term Goals: 8 weeks   Patient will report at least 15% increase in functional capacity from initial LEFS score to indicate  clinically significant functional improvement (goal met)  Patient will report at least 15 point increase on initial FOTO score to indicate clinically significant functional improvement (goal met)  Patient will demonstrate full day of ambulation without assistive device (goal met)     Long Term Goals: 16 weeks   Patient will report at least 30% increase in functional capacity from initial LEFS score to indicate clinically significant functional improvement (goal met)  Patient will report at least 30 point increase on initial FOTO score to indicate clinically significant functional improvement (goal met)  Patient will jog/run for 15 minutes without left knee pain    Plan   Consider DN to patella tendon if discomfort persists.  2-3x/week x 12 weeks    Adrianna Thompson, PT

## 2022-12-22 ENCOUNTER — CLINICAL SUPPORT (OUTPATIENT)
Dept: REHABILITATION | Facility: HOSPITAL | Age: 22
End: 2022-12-22
Payer: COMMERCIAL

## 2022-12-22 PROCEDURE — 97110 THERAPEUTIC EXERCISES: CPT

## 2022-12-22 PROCEDURE — 97530 THERAPEUTIC ACTIVITIES: CPT

## 2022-12-22 NOTE — PLAN OF CARE
Physical Therapy Treatment Note     Name: Migue Mistry  Clinic Number: [id number]    Therapy Diagnosis:   No diagnosis found.      Physician: Yariel Judge Jr., MD    Visit Date: 12/22/2022    Physician Orders: PT Eval and Treat  Medical Diagnosis from Referral: Left knee anterior cruciate ligament reconstruction with patella tendon autograft  Evaluation Date: 11/1/2022  Authorization Period Expiration: As needed  Plan of Care Expiration: 03/01/2022  Visit # / Visits authorized: 21/ As needed    Time In: 1100  Time Out: 1140  Total Billable Time: 40 minutes    Surgery: Left knee anterior cruciate ligament reconstruction with patella tendon autograft 10/27/22  Orthopedic Precautions: Per protocol, scanned into media section of EMR  Pertinent History: None    Subjective     Patient reports: No adverse reports. All is going well.    CMS Impairment/Limitation/Restriction for FOTO Survey  Therapist reviewed FOTO scores for Migue Mistry on 11/1/2022.   FOTO documents entered into EPIC - see Media section.  Patient's Physical FS Primary Measure: 24  Risk Adjusted Statistical FOTO: 46  Limitation Score: 76%  Category: Mobility  LEFS: 9.4/80 = 11.75% functional      Therapist reviewed FOTO scores for Migue Mistry on 11/18/2022.   FOTO documents entered into EPIC - see Media section.  Patient's Physical FS Primary Measure: 44  Risk Adjusted Statistical FOTO: 46  Limitation Score: 56%  Category: Mobility  LEFS: 28.4/80 = 35.50% functional    Therapist reviewed FOTO scores for Migue Mistry on 12/02/2022.   FOTO documents entered into EPIC - see Media section.  Patient's Physical FS Primary Measure: 52  Risk Adjusted Statistical FOTO: 46  Limitation Score: 48%  Category: Mobility  LEFS: 38.7/80 = 48.38% functional    Therapist reviewed FOTO scores for Migue Mistry on 12/16/2022.   FOTO documents entered into EPIC - see Media section.  Patient's Physical FS Primary Measure: 59  Risk Adjusted Statistical  FOTO: 46  Limitation Score: 41%  Category: Mobility  LEFS: 48.2/80 = 60.25% functional    Objective     ZAHRAA received the following treatment:     Time Activities   Manual  Left knee tibial on femoral internal rotation and posterior drawer grades 1-2 with movement    TherAct                        15 min NuStep (low level, high level), bridge with HS curl, knee flx lunge stretch, calf board (gastroc bias, hamstrings bias), step ups (fwd, lat), mini squats (w/ clamshells), monster walk, single leg stance holds (knee at 15 degree bend), SLS with contralateral band hip abd, single leg deadlift into high knee march, BOSU step into lunge (fwd, lat), plank on hands (with hand taps, with toe taps, with hip ext), eccentric step down (fwd, lat)    Dynamic balance and proprioceptive training with and without foam     TherEx 25 min There ex per flow sheet   Gait  Gait with visual cueing via mirror (normal step, high march step to reduce trendelenburg)   Neuro Re-ed  Russian e-stim to VMO and vastus lateralis co-contract with (wall TKE ball squeeze, mini squat holds, single leg stance with mini squat holds)   Modalities     E-Stim     Dry Needling     Canalith Repositioning           Home Exercises Provided and Patient Education Provided     Education provided:   -Plan of care, HEP, assistive device weaning    Assessment     Pt tolerated treatment well with no pain and normal gait. He required frequent rests with new exercise due to weakness and fatigue but denied pain. He does have occasional discomfort at patella tendon with prolonged standing activities which resolves with rest but this is common with his particular surgery and should resolve as strength improves. He will benefit from continued strengthening and proprioceptive training to return to PLOF and to reduce risk of injury.     Patient prognosis is Excellent.      Anticipated barriers to physical therapy: None    Goals: ZAHRAA Is progressing well towards his  goals.  Short Term Goals: 8 weeks   Patient will report at least 15% increase in functional capacity from initial LEFS score to indicate clinically significant functional improvement (goal met)  Patient will report at least 15 point increase on initial FOTO score to indicate clinically significant functional improvement (goal met)  Patient will demonstrate full day of ambulation without assistive device (goal met)     Long Term Goals: 16 weeks   Patient will report at least 30% increase in functional capacity from initial LEFS score to indicate clinically significant functional improvement (goal met)  Patient will report at least 30 point increase on initial FOTO score to indicate clinically significant functional improvement (goal met)  Patient will jog/run for 15 minutes without left knee pain    Plan   Consider DN to patella tendon if discomfort persists.  2-3x/week x 12 weeks    Adrianna Thompson, PT

## 2022-12-28 ENCOUNTER — CLINICAL SUPPORT (OUTPATIENT)
Dept: REHABILITATION | Facility: HOSPITAL | Age: 22
End: 2022-12-28
Payer: COMMERCIAL

## 2022-12-28 DIAGNOSIS — Z74.09 IMPAIRED FUNCTIONAL MOBILITY, BALANCE, AND ENDURANCE: Primary | ICD-10-CM

## 2022-12-28 PROCEDURE — 97530 THERAPEUTIC ACTIVITIES: CPT

## 2023-01-04 ENCOUNTER — CLINICAL SUPPORT (OUTPATIENT)
Dept: REHABILITATION | Facility: HOSPITAL | Age: 23
End: 2023-01-04
Payer: COMMERCIAL

## 2023-01-04 DIAGNOSIS — Z74.09 IMPAIRED FUNCTIONAL MOBILITY, BALANCE, AND ENDURANCE: Primary | ICD-10-CM

## 2023-01-04 PROCEDURE — 97530 THERAPEUTIC ACTIVITIES: CPT

## 2023-01-04 NOTE — PLAN OF CARE
Physical Therapy Treatment Note     Name: Migue Mistry  Clinic Number: [id number]    Therapy Diagnosis:   Encounter Diagnosis   Name Primary?    Impaired functional mobility, balance, and endurance Yes         Physician: Yariel Judge Jr., MD    Visit Date: 1/4/2023    Physician Orders: PT Eval and Treat  Medical Diagnosis from Referral: Left knee anterior cruciate ligament reconstruction with patella tendon autograft  Evaluation Date: 11/1/2022  Authorization Period Expiration: As needed  Plan of Care Expiration: 03/01/2022  Visit # / Visits authorized: 23/ As needed    Time In: 1011  Time Out: 1050  Total Billable Time: 39 minutes    Surgery: Left knee anterior cruciate ligament reconstruction with patella tendon autograft 10/27/22  Orthopedic Precautions: Per protocol, scanned into media section of EMR  Pertinent History: None    Subjective     Patient reports: No adverse reports. All is going well.    CMS Impairment/Limitation/Restriction for FOTO Survey  Therapist reviewed FOTO scores for Migue Mistry on 11/1/2022.   FOTO documents entered into EPIC - see Media section.  Patient's Physical FS Primary Measure: 24  Risk Adjusted Statistical FOTO: 46  Limitation Score: 76%  Category: Mobility  LEFS: 9.4/80 = 11.75% functional      Therapist reviewed FOTO scores for Migue Mistry on 11/18/2022.   FOTO documents entered into EPIC - see Media section.  Patient's Physical FS Primary Measure: 44  Risk Adjusted Statistical FOTO: 46  Limitation Score: 56%  Category: Mobility  LEFS: 28.4/80 = 35.50% functional    Therapist reviewed FOTO scores for Migue Mistry on 12/02/2022.   FOTO documents entered into EPIC - see Media section.  Patient's Physical FS Primary Measure: 52  Risk Adjusted Statistical FOTO: 46  Limitation Score: 48%  Category: Mobility  LEFS: 38.7/80 = 48.38% functional    Therapist reviewed FOTO scores for Migue Mistry on 12/16/2022.   FOTO documents entered into EPIC - see Media  section.  Patient's Physical FS Primary Measure: 59  Risk Adjusted Statistical FOTO: 46  Limitation Score: 41%  Category: Mobility  LEFS: 48.2/80 = 60.25% functional    Objective     ZAHRAA received the following treatment:     Time Activities   Manual  Left knee tibial on femoral internal rotation and posterior drawer grades 1-2 with movement    TherAct 39 min NuStep (low level, high level), bridge with HS curl, standing band hip (flx, abd, ext), standing band marching, single leg stance with band trunk rotation, single leg stance on disc with 3-way shoulder (abd, flx, scaption), SLS with disc (ball toss)    With all single leg stance activities, 15 deg knee bend     TherEx  There ex per flow sheet   Gait  Gait with visual cueing via mirror (normal step, high march step to reduce trendelenburg)   Neuro Re-ed  Russian e-stim to VMO and vastus lateralis co-contract with (wall TKE ball squeeze, mini squat holds, single leg stance with mini squat holds)   Modalities     E-Stim     Dry Needling     Canalith Repositioning           Home Exercises Provided and Patient Education Provided     Education provided:   -Plan of care, HEP, initiation of light jogging    Assessment     Continues to show significant improvement in quick time period. He continues to perform all exercises quicker, less difficulty, more fluid each session. Now incorporating more activities to work knee stabilization in unilateral stance and challenge via balance beam/airex; some of the activities still seeing some knee shaking which is expected at this stage post-op. Each subsequent session he shows significant balance improvement. He is clearly far beyond where he should be functionally and strength-wise. Starting 01/19 will be week 13 post-op, thus will begin plyometrics and more advance drills. Expect continued progression at fast pace and return to full activity fairly quickly.    Patient prognosis is Excellent.      Anticipated barriers to physical  therapy: None    Goals: ZAHRAA Is progressing well towards his goals.  Short Term Goals: 8 weeks   Patient will report at least 15% increase in functional capacity from initial LEFS score to indicate clinically significant functional improvement (goal met)  Patient will report at least 15 point increase on initial FOTO score to indicate clinically significant functional improvement (goal met)  Patient will demonstrate full day of ambulation without assistive device (goal met)     Long Term Goals: 16 weeks   Patient will report at least 30% increase in functional capacity from initial LEFS score to indicate clinically significant functional improvement (goal met)  Patient will report at least 30 point increase on initial FOTO score to indicate clinically significant functional improvement (goal met)  Patient will jog/run for 15 minutes without left knee pain    Plan   Consider DN to patella tendon if discomfort persists.  2-3x/week x 12 weeks    Aneta Marrero, PT

## 2023-01-06 ENCOUNTER — CLINICAL SUPPORT (OUTPATIENT)
Dept: REHABILITATION | Facility: HOSPITAL | Age: 23
End: 2023-01-06
Payer: COMMERCIAL

## 2023-01-06 DIAGNOSIS — R29.898 TRANSIENT LEFT LEG WEAKNESS: ICD-10-CM

## 2023-01-06 DIAGNOSIS — Z74.09 IMPAIRED FUNCTIONAL MOBILITY, BALANCE, AND ENDURANCE: Primary | ICD-10-CM

## 2023-01-06 PROCEDURE — 97530 THERAPEUTIC ACTIVITIES: CPT

## 2023-01-06 NOTE — PLAN OF CARE
Physical Therapy Treatment Note     Name: Migue Mistry  Clinic Number: [id number]    Therapy Diagnosis:   Encounter Diagnoses   Name Primary?    Impaired functional mobility, balance, and endurance Yes    Transient left leg weakness          Physician: Yariel Judge Jr., MD    Visit Date: 1/6/2023    Physician Orders: PT Eval and Treat  Medical Diagnosis from Referral: Left knee anterior cruciate ligament reconstruction with patella tendon autograft  Evaluation Date: 11/1/2022  Authorization Period Expiration: As needed  Plan of Care Expiration: 03/01/2022  Visit # / Visits authorized: 23/ As needed    Time In: 1009  Time Out: 1040  Total Billable Time: 31 minutes    Surgery: Left knee anterior cruciate ligament reconstruction with patella tendon autograft 10/27/22  Orthopedic Precautions: Per protocol, scanned into media section of EMR  Pertinent History: None    Subjective     Patient reports: No adverse reports. All is going well.    CMS Impairment/Limitation/Restriction for FOTO Survey  Therapist reviewed FOTO scores for Migue Mistry on 11/1/2022.   FOTO documents entered into EPIC - see Media section.  Patient's Physical FS Primary Measure: 24  Risk Adjusted Statistical FOTO: 46  Limitation Score: 76%  Category: Mobility  LEFS: 9.4/80 = 11.75% functional      Therapist reviewed FOTO scores for Migue Mistry on 11/18/2022.   FOTO documents entered into EPIC - see Media section.  Patient's Physical FS Primary Measure: 44  Risk Adjusted Statistical FOTO: 46  Limitation Score: 56%  Category: Mobility  LEFS: 28.4/80 = 35.50% functional    Therapist reviewed FOTO scores for Migue Mistry on 12/02/2022.   FOTO documents entered into EPIC - see Media section.  Patient's Physical FS Primary Measure: 52  Risk Adjusted Statistical FOTO: 46  Limitation Score: 48%  Category: Mobility  LEFS: 38.7/80 = 48.38% functional    Therapist reviewed FOTO scores for Migue Mistry on 12/16/2022.   FOTO documents  entered into EPIC - see Media section.  Patient's Physical FS Primary Measure: 59  Risk Adjusted Statistical FOTO: 46  Limitation Score: 41%  Category: Mobility  LEFS: 48.2/80 = 60.25% functional    Objective     ZAHRAA received the following treatment:     Time Activities   Manual  Left knee tibial on femoral internal rotation and posterior drawer grades 1-2 with movement    TherAct 31 min NuStep (low level, high level), bridge with HS curl, standing band hip (flx, abd, ext), standing band marching, single leg stance with band trunk rotation, single leg stance on disc with 3-way shoulder (abd, flx, scaption), SLS with disc (ball toss)    With all single leg stance activities, 15 deg knee bend     TherEx  There ex per flow sheet   Gait  Gait with visual cueing via mirror (normal step, high march step to reduce trendelenburg)   Neuro Re-ed  Russian e-stim to VMO and vastus lateralis co-contract with (wall TKE ball squeeze, mini squat holds, single leg stance with mini squat holds)   Modalities     E-Stim     Dry Needling     Canalith Repositioning           Home Exercises Provided and Patient Education Provided     Education provided:   -Plan of care, HEP, initiation of light jogging    Assessment     Continues to show significant improvement in quick time period. He continues to perform all exercises quicker, less difficulty, more fluid each session. Now incorporating more activities to work knee stabilization in unilateral stance and challenge via balance beam/airex; some of the activities still seeing some knee shaking which is expected at this stage post-op. Each subsequent session he shows significant balance improvement. He is clearly far beyond where he should be functionally and strength-wise. Starting 01/19 will be week 13 post-op, thus will begin plyometrics and more advance drills. Expect continued progression at fast pace and return to full activity fairly quickly.    Patient prognosis is Excellent.       Anticipated barriers to physical therapy: None    Goals: ZAHRAA Is progressing well towards his goals.  Short Term Goals: 8 weeks   Patient will report at least 15% increase in functional capacity from initial LEFS score to indicate clinically significant functional improvement (goal met)  Patient will report at least 15 point increase on initial FOTO score to indicate clinically significant functional improvement (goal met)  Patient will demonstrate full day of ambulation without assistive device (goal met)     Long Term Goals: 16 weeks   Patient will report at least 30% increase in functional capacity from initial LEFS score to indicate clinically significant functional improvement (goal met)  Patient will report at least 30 point increase on initial FOTO score to indicate clinically significant functional improvement (goal met)  Patient will jog/run for 15 minutes without left knee pain    Plan   Consider DN to patella tendon if discomfort persists.  2-3x/week x 12 weeks    Aneta Marrero, PT

## 2023-01-09 ENCOUNTER — CLINICAL SUPPORT (OUTPATIENT)
Dept: REHABILITATION | Facility: HOSPITAL | Age: 23
End: 2023-01-09
Payer: COMMERCIAL

## 2023-01-09 DIAGNOSIS — R29.898 TRANSIENT LEFT LEG WEAKNESS: ICD-10-CM

## 2023-01-09 DIAGNOSIS — Z74.09 IMPAIRED FUNCTIONAL MOBILITY, BALANCE, AND ENDURANCE: Primary | ICD-10-CM

## 2023-01-09 PROCEDURE — 97530 THERAPEUTIC ACTIVITIES: CPT

## 2023-01-09 NOTE — PLAN OF CARE
Physical Therapy Treatment Note     Name: Migue Mistry  Clinic Number: [id number]    Therapy Diagnosis:   Encounter Diagnoses   Name Primary?    Impaired functional mobility, balance, and endurance Yes    Transient left leg weakness          Physician: Yariel Judge Jr., MD    Visit Date: 1/9/2023    Physician Orders: PT Eval and Treat  Medical Diagnosis from Referral: Left knee anterior cruciate ligament reconstruction with patella tendon autograft  Evaluation Date: 11/1/2022  Authorization Period Expiration: As needed  Plan of Care Expiration: 03/01/2022  Visit # / Visits authorized: 25/ As needed    Time In: 1009  Time Out: 1040  Total Billable Time: 31 minutes    Surgery: Left knee anterior cruciate ligament reconstruction with patella tendon autograft 10/27/22  Orthopedic Precautions: Per protocol, scanned into media section of EMR  Pertinent History: None    Subjective     Patient reports: No adverse reports. All is going well.    CMS Impairment/Limitation/Restriction for FOTO Survey  Therapist reviewed FOTO scores for Migue Mistry on 11/1/2022.   FOTO documents entered into EPIC - see Media section.  Patient's Physical FS Primary Measure: 24  Risk Adjusted Statistical FOTO: 46  Limitation Score: 76%  Category: Mobility  LEFS: 9.4/80 = 11.75% functional      Therapist reviewed FOTO scores for Migue Mistry on 11/18/2022.   FOTO documents entered into EPIC - see Media section.  Patient's Physical FS Primary Measure: 44  Risk Adjusted Statistical FOTO: 46  Limitation Score: 56%  Category: Mobility  LEFS: 28.4/80 = 35.50% functional    Therapist reviewed FOTO scores for Migue Mistry on 12/02/2022.   FOTO documents entered into EPIC - see Media section.  Patient's Physical FS Primary Measure: 52  Risk Adjusted Statistical FOTO: 46  Limitation Score: 48%  Category: Mobility  LEFS: 38.7/80 = 48.38% functional    Therapist reviewed FOTO scores for Migue Mistry on 12/16/2022.   FOTO documents  entered into [facility] - see Media section.  Patient's Physical FS Primary Measure: 59  Risk Adjusted Statistical FOTO: 46  Limitation Score: 41%  Category: Mobility  LEFS: 48.2/80 = 60.25% functional    Objective     ZAHRAA received the following treatment:     Time Activities   Manual  Left knee tibial on femoral internal rotation and posterior drawer grades 1-2 with movement    TherAct 31 min NuStep (low level, high level), bridge with HS curl, jump squats, deadlift squat, jumping toe taps, standing band hip (flx, abd, ext), standing band marching, SLS with disc (ball toss), box jumps double leg (fwd on>fwd off, lat on>lat off), planks jumps (LEs side to side)    With all single leg stance activities, 15 deg knee bend     TherEx  There ex per flow sheet   Gait  Gait with visual cueing via mirror (normal step, high march step to reduce trendelenburg)   Neuro Re-ed  Russian e-stim to VMO and vastus lateralis co-contract with (wall TKE ball squeeze, mini squat holds, single leg stance with mini squat holds)   Modalities     E-Stim     Dry Needling     Canalith Repositioning           Home Exercises Provided and Patient Education Provided     Education provided:   -Plan of care, HEP, initiation of light jogging    Assessment     Continues to show significant improvement in quick time period. Initiated jumping activities to build stability, and he tolerated very well. He is clearly far beyond where he should be functionally and strength-wise.thus did not wait until 01/19 (week 13 post-op) to begin light jumping. Will progress into more difficult plyometrics and advance drills with time and mastery of fundamental activities. Expect continued progression at fast pace and return to full activity fairly quickly.    Patient prognosis is Excellent.      Anticipated barriers to physical therapy: None    Goals: ZAHRAA Is progressing well towards his goals.  Short Term Goals: 8 weeks   Patient will report at least 15% increase in  functional capacity from initial LEFS score to indicate clinically significant functional improvement (goal met)  Patient will report at least 15 point increase on initial FOTO score to indicate clinically significant functional improvement (goal met)  Patient will demonstrate full day of ambulation without assistive device (goal met)     Long Term Goals: 16 weeks   Patient will report at least 30% increase in functional capacity from initial LEFS score to indicate clinically significant functional improvement (goal met)  Patient will report at least 30 point increase on initial FOTO score to indicate clinically significant functional improvement (goal met)  Patient will jog/run for 15 minutes without left knee pain    Plan   Consider DN to patella tendon if discomfort persists.  2-3x/week x 12 weeks    Aneta Marrero, PT

## 2023-01-11 ENCOUNTER — CLINICAL SUPPORT (OUTPATIENT)
Dept: REHABILITATION | Facility: HOSPITAL | Age: 23
End: 2023-01-11
Payer: COMMERCIAL

## 2023-01-11 DIAGNOSIS — R29.898 TRANSIENT LEFT LEG WEAKNESS: ICD-10-CM

## 2023-01-11 DIAGNOSIS — Z74.09 IMPAIRED FUNCTIONAL MOBILITY, BALANCE, AND ENDURANCE: Primary | ICD-10-CM

## 2023-01-11 PROCEDURE — 97530 THERAPEUTIC ACTIVITIES: CPT

## 2023-01-11 NOTE — PLAN OF CARE
Physical Therapy Treatment Note     Name: Migue Mistry  Clinic Number: [id number]    Therapy Diagnosis:   Encounter Diagnoses   Name Primary?    Impaired functional mobility, balance, and endurance Yes    Transient left leg weakness          Physician: Yariel Judge Jr., MD    Visit Date: 1/11/2023    Physician Orders: PT Eval and Treat  Medical Diagnosis from Referral: Left knee anterior cruciate ligament reconstruction with patella tendon autograft  Evaluation Date: 11/1/2022  Authorization Period Expiration: As needed  Plan of Care Expiration: 03/01/2022  Visit # / Visits authorized: 25/ As needed    Time In: 1014  Time Out: 1052  Total Billable Time: 38 minutes    Surgery: Left knee anterior cruciate ligament reconstruction with patella tendon autograft 10/27/22  Orthopedic Precautions: Per protocol, scanned into media section of EMR  Pertinent History: None    Subjective     Patient reports: No adverse reports. All is going well.    CMS Impairment/Limitation/Restriction for FOTO Survey  Therapist reviewed FOTO scores for Migue Mistry on 11/1/2022.   FOTO documents entered into EPIC - see Media section.  Patient's Physical FS Primary Measure: 24  Risk Adjusted Statistical FOTO: 46  Limitation Score: 76%  Category: Mobility  LEFS: 9.4/80 = 11.75% functional      Therapist reviewed FOTO scores for Migue Mistry on 11/18/2022.   FOTO documents entered into EPIC - see Media section.  Patient's Physical FS Primary Measure: 44  Risk Adjusted Statistical FOTO: 46  Limitation Score: 56%  Category: Mobility  LEFS: 28.4/80 = 35.50% functional    Therapist reviewed FOTO scores for Migue Mistry on 12/02/2022.   FOTO documents entered into EPIC - see Media section.  Patient's Physical FS Primary Measure: 52  Risk Adjusted Statistical FOTO: 46  Limitation Score: 48%  Category: Mobility  LEFS: 38.7/80 = 48.38% functional    Therapist reviewed FOTO scores for Migue Mistry on 12/16/2022.   FOTO documents  entered into EPIC - see Media section.  Patient's Physical FS Primary Measure: 59  Risk Adjusted Statistical FOTO: 46  Limitation Score: 41%  Category: Mobility  LEFS: 48.2/80 = 60.25% functional    Objective     ZAHRAA received the following treatment:     Time Activities   Manual  Left knee tibial on femoral internal rotation and posterior drawer grades 1-2 with movement    TherAct 38 min NuStep (low level, high level), bridge with HS curl, jump squats, deadlift squat, jumping toe taps, standing band hip (flx, abd, ext), standing band marching, box jumps double leg (fwd on>fwd off, lat on>lat off), planks jumps (LEs side to side)    With all single leg stance activities, 15 deg knee bend     TherEx  There ex per flow sheet   Gait  Gait with visual cueing via mirror (normal step, high march step to reduce trendelenburg)   Neuro Re-ed  Russian e-stim to VMO and vastus lateralis co-contract with (wall TKE ball squeeze, mini squat holds, single leg stance with mini squat holds)   Modalities     E-Stim     Dry Needling     Canalith Repositioning           Home Exercises Provided and Patient Education Provided     Education provided:   -Plan of care, HEP, initiation of light jogging    Assessment     Continues to show significant improvement in quick time period. Initiated jumping activities to build stability, and he tolerated very well. He is clearly far beyond where he should be functionally and strength-wise thus did not wait until 01/19 (week 13 post-op) to begin light jumping. Will progress into more difficult plyometrics and advance drills with time and mastery of fundamental activities. Expect continued progression at fast pace and return to full activity fairly quickly. Spoke with him regarding plan of care and when he would like to transition to independent HEP. He still has a ways to go to reach pre-injury level, especially if progressing along protocol guidelines.    Patient prognosis is Excellent.       Anticipated barriers to physical therapy: None    Goals: ZAHRAA Is progressing well towards his goals.  Short Term Goals: 8 weeks   Patient will report at least 15% increase in functional capacity from initial LEFS score to indicate clinically significant functional improvement (goal met)  Patient will report at least 15 point increase on initial FOTO score to indicate clinically significant functional improvement (goal met)  Patient will demonstrate full day of ambulation without assistive device (goal met)     Long Term Goals: 16 weeks   Patient will report at least 30% increase in functional capacity from initial LEFS score to indicate clinically significant functional improvement (goal met)  Patient will report at least 30 point increase on initial FOTO score to indicate clinically significant functional improvement (goal met)  Patient will jog/run for 15 minutes without left knee pain    Plan   Consider DN to patella tendon if discomfort persists.  2-3x/week x 12 weeks    Aneta Marrero, PT

## 2023-01-13 ENCOUNTER — CLINICAL SUPPORT (OUTPATIENT)
Dept: REHABILITATION | Facility: HOSPITAL | Age: 23
End: 2023-01-13
Payer: COMMERCIAL

## 2023-01-13 DIAGNOSIS — Z74.09 IMPAIRED FUNCTIONAL MOBILITY, BALANCE, AND ENDURANCE: Primary | ICD-10-CM

## 2023-01-13 DIAGNOSIS — R29.898 TRANSIENT LEFT LEG WEAKNESS: ICD-10-CM

## 2023-01-13 PROCEDURE — 97530 THERAPEUTIC ACTIVITIES: CPT

## 2023-01-13 NOTE — PLAN OF CARE
Physical Therapy Treatment Note     Name: Migue Mistry  Clinic Number: [id number]    Therapy Diagnosis:   Encounter Diagnoses   Name Primary?    Impaired functional mobility, balance, and endurance Yes    Transient left leg weakness          Physician: Yariel Judge Jr., MD    Visit Date: 1/13/2023    Physician Orders: PT Eval and Treat  Medical Diagnosis from Referral: Left knee anterior cruciate ligament reconstruction with patella tendon autograft  Evaluation Date: 11/1/2022  Authorization Period Expiration: As needed  Plan of Care Expiration: 03/01/2022  Visit # / Visits authorized: 27/ As needed    Time In: 1010  Time Out: 1034  Total Billable Time: 24 minutes    Surgery: Left knee anterior cruciate ligament reconstruction with patella tendon autograft 10/27/22  Orthopedic Precautions: Per protocol, scanned into media section of EMR  Pertinent History: None    Subjective     Patient reports: No adverse reports. All is going well.    CMS Impairment/Limitation/Restriction for FOTO Survey  Therapist reviewed FOTO scores for Migue Mistry on 11/1/2022.   FOTO documents entered into EPIC - see Media section.  Patient's Physical FS Primary Measure: 24  Risk Adjusted Statistical FOTO: 46  Limitation Score: 76%  Category: Mobility  LEFS: 9.4/80 = 11.75% functional      Therapist reviewed FOTO scores for Migue Mistry on 11/18/2022.   FOTO documents entered into EPIC - see Media section.  Patient's Physical FS Primary Measure: 44  Risk Adjusted Statistical FOTO: 46  Limitation Score: 56%  Category: Mobility  LEFS: 28.4/80 = 35.50% functional    Therapist reviewed FOTO scores for Migue Mistry on 12/02/2022.   FOTO documents entered into EPIC - see Media section.  Patient's Physical FS Primary Measure: 52  Risk Adjusted Statistical FOTO: 46  Limitation Score: 48%  Category: Mobility  LEFS: 38.7/80 = 48.38% functional    Therapist reviewed FOTO scores for Migue Mistry on 12/16/2022.   FOTO documents  entered into EPIC - see Media section.  Patient's Physical FS Primary Measure: 59  Risk Adjusted Statistical FOTO: 46  Limitation Score: 41%  Category: Mobility  LEFS: 48.2/80 = 60.25% functional    Objective     ZAHRAA received the following treatment:     Time Activities   Manual  Left knee tibial on femoral internal rotation and posterior drawer grades 1-2 with movement    TherAct 24 min NuStep (low level, high level), bridge with HS curl, jump squats, deadlift squat, jumping toe taps, standing band hip (flx, abd, ext), standing band marching, box jumps double leg (fwd on>fwd off, lat on>lat off), planks jumps (LEs side to side)    With all single leg stance activities, 15 deg knee bend     Athletics  Box jumps fwd (2 on 1 off, 1 on 1 off)  Box jumps lat (2 on 1 off, 1 on 1 off)  Lateral hop>take off  Square single leg jump  Diagonal single leg jump  Defense stance>backpedal  Fwd jump distance  Lat jump distance     Gait  Gait with visual cueing via mirror (normal step, high march step to reduce trendelenburg)   Neuro Re-ed  Russian e-stim to VMO and vastus lateralis co-contract with (wall TKE ball squeeze, mini squat holds, single leg stance with mini squat holds)   Modalities     E-Stim     Dry Needling     Canalith Repositioning           Home Exercises Provided and Patient Education Provided     Education provided:   -Plan of care, HEP, initiation of light jogging    Assessment     Continues to show significant improvement in quick time period. Next week will kyra post-op week 13 and per protocol can initiate sport-specific drills, agility drills, running program and plyometric program at 2.5 months post-op.     Initiated jumping activities to build stability, and he tolerated very well. He is clearly far beyond where he should be functionally and strength-wise thus did not wait until 01/19 (week 13 post-op) to begin light jumping. Will progress into more difficult plyometrics and advance drills with time and  mastery of fundamental activities. Expect continued progression at fast pace and return to full activity fairly quickly. Spoke with him regarding plan of care and when he would like to transition to independent HEP. He still has a ways to go to reach pre-injury level, especially if progressing along protocol guidelines.    Patient prognosis is Excellent.      Anticipated barriers to physical therapy: None    Goals: ZAHRAA Is progressing well towards his goals.  Short Term Goals: 8 weeks   Patient will report at least 15% increase in functional capacity from initial LEFS score to indicate clinically significant functional improvement (goal met)  Patient will report at least 15 point increase on initial FOTO score to indicate clinically significant functional improvement (goal met)  Patient will demonstrate full day of ambulation without assistive device (goal met)     Long Term Goals: 16 weeks   Patient will report at least 30% increase in functional capacity from initial LEFS score to indicate clinically significant functional improvement (goal met)  Patient will report at least 30 point increase on initial FOTO score to indicate clinically significant functional improvement (goal met)  Patient will jog/run for 15 minutes without left knee pain    Plan     2-3x/week x 12 weeks    Aneta Marrero, PT

## 2023-01-20 ENCOUNTER — CLINICAL SUPPORT (OUTPATIENT)
Dept: REHABILITATION | Facility: HOSPITAL | Age: 23
End: 2023-01-20
Payer: COMMERCIAL

## 2023-01-20 DIAGNOSIS — Z74.09 IMPAIRED FUNCTIONAL MOBILITY, BALANCE, AND ENDURANCE: Primary | ICD-10-CM

## 2023-01-20 PROCEDURE — 97530 THERAPEUTIC ACTIVITIES: CPT

## 2023-01-20 NOTE — PLAN OF CARE
Physical Therapy Treatment Note     Name: Migue Mistry  Clinic Number: [id number]    Therapy Diagnosis:   Encounter Diagnosis   Name Primary?    Impaired functional mobility, balance, and endurance Yes         Physician: Yariel Judge Jr., MD    Visit Date: 1/20/2023    Physician Orders: PT Eval and Treat  Medical Diagnosis from Referral: Left knee anterior cruciate ligament reconstruction with patella tendon autograft  Evaluation Date: 11/1/2022  Authorization Period Expiration: As needed  Plan of Care Expiration: 03/01/2022  Visit # / Visits authorized: 28/ As needed    Time In: 0955  Time Out: 1022  Total Billable Time: 27 minutes    Surgery: Left knee anterior cruciate ligament reconstruction with patella tendon autograft 10/27/22  Orthopedic Precautions: Per protocol, scanned into media section of EMR  Pertinent History: None    Subjective     Patient reports: No adverse reports. All is going well.    CMS Impairment/Limitation/Restriction for FOTO Survey  Therapist reviewed FOTO scores for Migue Mistry on 11/1/2022.   FOTO documents entered into EPIC - see Media section.  Patient's Physical FS Primary Measure: 24  Risk Adjusted Statistical FOTO: 46  Limitation Score: 76%  Category: Mobility  LEFS: 9.4/80 = 11.75% functional      Therapist reviewed FOTO scores for Migue iMstry on 11/18/2022.   FOTO documents entered into EPIC - see Media section.  Patient's Physical FS Primary Measure: 44  Risk Adjusted Statistical FOTO: 46  Limitation Score: 56%  Category: Mobility  LEFS: 28.4/80 = 35.50% functional    Therapist reviewed FOTO scores for Migue Mistry on 12/02/2022.   FOTO documents entered into EPIC - see Media section.  Patient's Physical FS Primary Measure: 52  Risk Adjusted Statistical FOTO: 46  Limitation Score: 48%  Category: Mobility  LEFS: 38.7/80 = 48.38% functional    Therapist reviewed FOTO scores for Migue Mistry on 12/16/2022.   FOTO documents entered into EPIC - see Media  section.  Patient's Physical FS Primary Measure: 59  Risk Adjusted Statistical FOTO: 46  Limitation Score: 41%  Category: Mobility  LEFS: 48.2/80 = 60.25% functional    Objective     ZAHRAA received the following treatment:     Time Activities   Manual  Left knee tibial on femoral internal rotation and posterior drawer grades 1-2 with movement    TherAct  NuStep (low level, high level), bridge with HS curl, jump squats, deadlift squat, jumping toe taps, standing band hip (flx, abd, ext), standing band marching, box jumps double leg (fwd on>fwd off, lat on>lat off), planks jumps (LEs side to side)    With all single leg stance activities, 15 deg knee bend     Athletics 27 min Box jumps fwd (2 on 1 off, 1 on 1 off)  Box jumps lat (2 on 1 off, 1 on 1 off)  Lateral hop>take off  Square single leg jump  Diagonal single leg jump  Defense stance>backpedal  Fwd jump distance  Lat jump distance    NuStep (low level, high level)     Gait  Gait with visual cueing via mirror (normal step, high march step to reduce trendelenburg)   Neuro Re-ed  Russian e-stim to VMO and vastus lateralis co-contract with (wall TKE ball squeeze, mini squat holds, single leg stance with mini squat holds)   Modalities     E-Stim     Dry Needling     Canalith Repositioning           Home Exercises Provided and Patient Education Provided     Education provided:   -Plan of care, HEP, initiation of light jogging    Assessment     Continues to show significant improvement in quick time period. This week marks post-op week 13 and per protocol can initiate sport-specific drills, agility drills, running program and plyometric program at 2.5 months post-op which we started today and he tolerated very well.    He is clearly far beyond where he should be functionally and strength-wise thus did not wait until 01/19 (week 13 post-op) to begin light jumping. Will progress into more difficult plyometrics and advance drills with time and mastery of fundamental  activities. Expect continued progression at fast pace and return to full activity fairly quickly. Spoke with him regarding plan of care and when he would like to transition to independent HEP. He still has a ways to go to reach pre-injury level, especially if progressing along protocol guidelines.    Patient prognosis is Excellent.      Anticipated barriers to physical therapy: None    Goals: ZAHRAA Is progressing well towards his goals.  Short Term Goals: 8 weeks   Patient will report at least 15% increase in functional capacity from initial LEFS score to indicate clinically significant functional improvement (goal met)  Patient will report at least 15 point increase on initial FOTO score to indicate clinically significant functional improvement (goal met)  Patient will demonstrate full day of ambulation without assistive device (goal met)     Long Term Goals: 16 weeks   Patient will report at least 30% increase in functional capacity from initial LEFS score to indicate clinically significant functional improvement (goal met)  Patient will report at least 30 point increase on initial FOTO score to indicate clinically significant functional improvement (goal met)  Patient will jog/run for 15 minutes without left knee pain    Plan     2-3x/week x 12 weeks    Aneta Marrero, PT

## 2023-01-23 ENCOUNTER — OFFICE VISIT (OUTPATIENT)
Dept: ORTHOPEDICS | Facility: CLINIC | Age: 23
End: 2023-01-23
Payer: COMMERCIAL

## 2023-01-23 ENCOUNTER — CLINICAL SUPPORT (OUTPATIENT)
Dept: REHABILITATION | Facility: HOSPITAL | Age: 23
End: 2023-01-23
Payer: COMMERCIAL

## 2023-01-23 DIAGNOSIS — Z74.09 IMPAIRED FUNCTIONAL MOBILITY, BALANCE, AND ENDURANCE: Primary | ICD-10-CM

## 2023-01-23 DIAGNOSIS — Z98.890 S/P ACL RECONSTRUCTION: Primary | ICD-10-CM

## 2023-01-23 PROCEDURE — 1159F MED LIST DOCD IN RCRD: CPT | Mod: CPTII,,, | Performed by: ORTHOPAEDIC SURGERY

## 2023-01-23 PROCEDURE — 99024 PR POST-OP FOLLOW-UP VISIT: ICD-10-PCS | Mod: ,,, | Performed by: ORTHOPAEDIC SURGERY

## 2023-01-23 PROCEDURE — 97530 THERAPEUTIC ACTIVITIES: CPT

## 2023-01-23 PROCEDURE — 1159F PR MEDICATION LIST DOCUMENTED IN MEDICAL RECORD: ICD-10-PCS | Mod: CPTII,,, | Performed by: ORTHOPAEDIC SURGERY

## 2023-01-23 PROCEDURE — 99024 POSTOP FOLLOW-UP VISIT: CPT | Mod: ,,, | Performed by: ORTHOPAEDIC SURGERY

## 2023-01-23 NOTE — PLAN OF CARE
Physical Therapy Treatment Note     Name: Migue Mistry  Clinic Number: [id number]    Therapy Diagnosis:   Encounter Diagnosis   Name Primary?    Impaired functional mobility, balance, and endurance Yes         Physician: Yariel Judge Jr., MD    Visit Date: 1/23/2023    Physician Orders: PT Eval and Treat  Medical Diagnosis from Referral: Left knee anterior cruciate ligament reconstruction with patella tendon autograft  Evaluation Date: 11/1/2022  Authorization Period Expiration: As needed  Plan of Care Expiration: 03/01/2022  Visit # / Visits authorized: 29/ As needed    Time In: 1000  Time Out: 1025  Total Billable Time: 25 minutes    Surgery: Left knee anterior cruciate ligament reconstruction with patella tendon autograft 10/27/22  Orthopedic Precautions: Per protocol, scanned into media section of EMR  Pertinent History: None    Subjective     Patient reports: No adverse reports. All is going well. Mild right quad soreness following previous session.    CMS Impairment/Limitation/Restriction for FOTO Survey  Therapist reviewed FOTO scores for Migue Mistry on 11/1/2022.   FOTO documents entered into EP[employer] - see Media section.  Patient's Physical FS Primary Measure: 24  Risk Adjusted Statistical FOTO: 46  Limitation Score: 76%  Category: Mobility  LEFS: 9.4/80 = 11.75% functional      Therapist reviewed FOTO scores for Migue Mistry on 11/18/2022.   FOTO documents entered into EPIC - see Media section.  Patient's Physical FS Primary Measure: 44  Risk Adjusted Statistical FOTO: 46  Limitation Score: 56%  Category: Mobility  LEFS: 28.4/80 = 35.50% functional    Therapist reviewed FOTO scores for Migue Mistry on 12/02/2022.   FOTO documents entered into EPIC - see Media section.  Patient's Physical FS Primary Measure: 52  Risk Adjusted Statistical FOTO: 46  Limitation Score: 48%  Category: Mobility  LEFS: 38.7/80 = 48.38% functional    Therapist reviewed FOTO scores for Migue Mistry on  12/16/2022.   FOTO documents entered into [employer] - see Media section.  Patient's Physical FS Primary Measure: 59  Risk Adjusted Statistical FOTO: 46  Limitation Score: 41%  Category: Mobility  LEFS: 48.2/80 = 60.25% functional    Objective     ZAHRAA received the following treatment:     Time Activities   Manual  Left knee tibial on femoral internal rotation and posterior drawer grades 1-2 with movement    TherAct 25 min NuStep (low level, high level), bridge with HS curl, jump squats, deadlift squat, standing band hip (flx, abd, ext), planks jumps (LEs side to side)    With all single leg stance activities, 15 deg knee bend     Athletics  Box jumps fwd (2 on 1 off, 1 on 1 off)  Box jumps lat (2 on 1 off, 1 on 1 off)  Lateral hop>take off  Square single leg jump  Diagonal single leg jump  Defense stance>backpedal  Fwd jump distance  Lat jump distance    NuStep (low level, high level)     Gait  Gait with visual cueing via mirror (normal step, high march step to reduce trendelenburg)   Neuro Re-ed  Russian e-stim to VMO and vastus lateralis co-contract with (wall TKE ball squeeze, mini squat holds, single leg stance with mini squat holds)         Home Exercises Provided and Patient Education Provided     Education provided:   -Plan of care, HEP, initiation of light jogging    Assessment     This session withheld athletics 2/2 his footwear (was wearing low cut walking shoes).    Continues to show significant improvement in quick time period. This week marks post-op week 13 and per protocol can initiate sport-specific drills, agility drills, running program and plyometric program at 2.5 months post-op which we started today and he tolerated very well.    He is clearly far beyond where he should be functionally and strength-wise thus did not wait until 01/19 (week 13 post-op) to begin light jumping. Will progress into more difficult plyometrics and advance drills with time and mastery of fundamental activities. Expect  continued progression at fast pace and return to full activity fairly quickly. Spoke with him regarding plan of care and when he would like to transition to independent HEP. He still has a ways to go to reach pre-injury level, especially if progressing along protocol guidelines.    Patient prognosis is Excellent.      Anticipated barriers to physical therapy: None    Goals: ZAHRAA Is progressing well towards his goals.  Short Term Goals: 8 weeks   Patient will report at least 15% increase in functional capacity from initial LEFS score to indicate clinically significant functional improvement (goal met)  Patient will report at least 15 point increase on initial FOTO score to indicate clinically significant functional improvement (goal met)  Patient will demonstrate full day of ambulation without assistive device (goal met)     Long Term Goals: 16 weeks   Patient will report at least 30% increase in functional capacity from initial LEFS score to indicate clinically significant functional improvement (goal met)  Patient will report at least 30 point increase on initial FOTO score to indicate clinically significant functional improvement (goal met)  Patient will jog/run for 15 minutes without left knee pain    Plan     2-3x/week x 12 weeks    Aneta Marrero, PT

## 2023-01-23 NOTE — PROGRESS NOTES
Chief Complaint:   Chief Complaint   Patient presents with    Left Knee - Follow-up    Follow-up     3 month s/p left knee anterior cruciate ligament reconstructionwith patella tendon autograft 10/27/22 gl 1/25/23, attending P.T., currently not wearing brace, pt states he has no complains today. Unabnle to obtain vitals.        History of present illness:  10/27/2022: Left ACL recon with patella tendon autograft    He returns today.  His pain is under good control.  He is working in therapy    Musculoskeletal:   Incisions healed.  Range of motion 0-120 degrees stable ligamentously.       Assessment: S/P ACL reconstruction        Plan:  Doing well status post above.  Continue rehab and transition to home exercise program with strengthening..  I will see him back in 12 weeks for a ROM check, ACL testing.

## 2023-01-27 ENCOUNTER — CLINICAL SUPPORT (OUTPATIENT)
Dept: REHABILITATION | Facility: HOSPITAL | Age: 23
End: 2023-01-27
Payer: COMMERCIAL

## 2023-01-27 DIAGNOSIS — Z74.09 IMPAIRED FUNCTIONAL MOBILITY, BALANCE, AND ENDURANCE: Primary | ICD-10-CM

## 2023-01-27 PROCEDURE — 97530 THERAPEUTIC ACTIVITIES: CPT

## 2023-01-27 NOTE — PLAN OF CARE
Physical Therapy Treatment Note     Name: Migue Mistry  Clinic Number: [id number]    Therapy Diagnosis:   Encounter Diagnosis   Name Primary?    Impaired functional mobility, balance, and endurance Yes         Physician: Yariel Judge Jr., MD    Visit Date: 1/27/2023    Physician Orders: PT Eval and Treat  Medical Diagnosis from Referral: Left knee anterior cruciate ligament reconstruction with patella tendon autograft  Evaluation Date: 11/1/2022  Authorization Period Expiration: As needed  Plan of Care Expiration: 03/01/2022  Visit # / Visits authorized: 30/ As needed    Time In: 1004  Time Out: 1035  Total Billable Time: 31 minutes    Surgery: Left knee anterior cruciate ligament reconstruction with patella tendon autograft 10/27/22  Orthopedic Precautions: Per protocol, scanned into media section of EMR  Pertinent History: None    Subjective     Patient reports: No adverse reports. All is going well. Went jogging the other day, states it felt fine.    CMS Impairment/Limitation/Restriction for FOTO Survey  Therapist reviewed FOTO scores for Migue Mistry on 11/1/2022.   FOTO documents entered into EPIC - see Media section.  Patient's Physical FS Primary Measure: 24  Risk Adjusted Statistical FOTO: 46  Limitation Score: 76%  Category: Mobility  LEFS: 9.4/80 = 11.75% functional      Therapist reviewed FOTO scores for Migue Mistry on 11/18/2022.   FOTO documents entered into EPIC - see Media section.  Patient's Physical FS Primary Measure: 44  Risk Adjusted Statistical FOTO: 46  Limitation Score: 56%  Category: Mobility  LEFS: 28.4/80 = 35.50% functional    Therapist reviewed FOTO scores for Migue Mistry on 12/02/2022.   FOTO documents entered into EPIC - see Media section.  Patient's Physical FS Primary Measure: 52  Risk Adjusted Statistical FOTO: 46  Limitation Score: 48%  Category: Mobility  LEFS: 38.7/80 = 48.38% functional    Therapist reviewed FOTO scores for Migue Mistry on 12/16/2022.    FOTO documents entered into EPIC - see Media section.  Patient's Physical FS Primary Measure: 59  Risk Adjusted Statistical FOTO: 46  Limitation Score: 41%  Category: Mobility  LEFS: 48.2/80 = 60.25% functional      Therapist reviewed FOTO scores for Zahraa Mistry on 01/27/2023.   FOTO documents entered into EPIC - see Media section.  Patient's Physical FS Primary Measure: 68  Risk Adjusted Statistical FOTO: 46  Limitation Score: 32%  Category: Mobility  LEFS: 59.8/80 = 74.75% functional      Objective     ZAHRAA received the following treatment:     Time Activities   Manual  Left knee tibial on femoral internal rotation and posterior drawer grades 1-2 with movement    TherAct  NuStep (low level, high level), bridge with HS curl, jump squats, deadlift squat, standing band hip (flx, abd, ext), planks jumps (LEs side to side)    With all single leg stance activities, 15 deg knee bend     Athletics 31 min Box jumps fwd (2 on 1 off, 1 on 1 off)  Box jumps lat (2 on 1 off, 1 on 1 off)  Lateral hop>take off  Square single leg jump  Diagonal single leg jump  Defense stance>backpedal  Fwd jump distance  Lat jump distance    Bridge on stab ball with HS curl     Gait  Gait with visual cueing via mirror (normal step, high march step to reduce trendelenburg)   Neuro Re-ed  Russian e-stim to VMO and vastus lateralis co-contract with (wall TKE ball squeeze, mini squat holds, single leg stance with mini squat holds)         Home Exercises Provided and Patient Education Provided     Education provided:   -Plan of care, HEP, initiation of light jogging    Assessment     Plyometrics, athletic drills done this session. Increased running speed, jumping distance, cutting speed and fluidity. He is progressing very well. Encouraged him to begin going to the gym for progressive (but cautious) jogging/running.    Will continue to progress into more difficult plyometrics and advance drills. Spoke with him regarding plan of care and when  he would like to transition to independent HEP. He still has a ways to go to reach pre-injury level, especially if progressing along protocol guidelines.    Patient prognosis is Excellent.      Anticipated barriers to physical therapy: None    Goals: ZAHRAA Is progressing well towards his goals.  Short Term Goals: 8 weeks   Patient will report at least 15% increase in functional capacity from initial LEFS score to indicate clinically significant functional improvement (goal met)  Patient will report at least 15 point increase on initial FOTO score to indicate clinically significant functional improvement (goal met)  Patient will demonstrate full day of ambulation without assistive device (goal met)     Long Term Goals: 16 weeks   Patient will report at least 30% increase in functional capacity from initial LEFS score to indicate clinically significant functional improvement (goal met)  Patient will report at least 30 point increase on initial FOTO score to indicate clinically significant functional improvement (goal met)  Patient will jog/run for 15 minutes without left knee pain    Plan     2-3x/week x 12 weeks    Aneta Marrero, PT

## 2023-02-01 ENCOUNTER — CLINICAL SUPPORT (OUTPATIENT)
Dept: REHABILITATION | Facility: HOSPITAL | Age: 23
End: 2023-02-01
Payer: COMMERCIAL

## 2023-02-01 DIAGNOSIS — Z74.09 IMPAIRED FUNCTIONAL MOBILITY, BALANCE, AND ENDURANCE: Primary | ICD-10-CM

## 2023-02-01 PROCEDURE — 97530 THERAPEUTIC ACTIVITIES: CPT

## 2023-02-01 NOTE — PLAN OF CARE
Physical Therapy Treatment Note     Name: Migue Mistry  Clinic Number: [id number]    Therapy Diagnosis:   Encounter Diagnosis   Name Primary?    Impaired functional mobility, balance, and endurance Yes         Physician: Yariel Judge Jr., MD    Visit Date: 2/1/2023    Physician Orders: PT Eval and Treat  Medical Diagnosis from Referral: Left knee anterior cruciate ligament reconstruction with patella tendon autograft  Evaluation Date: 11/1/2022  Authorization Period Expiration: As needed  Plan of Care Expiration: 03/01/2022  Visit # / Visits authorized: 31/ As needed    Time In: 1005  Time Out: 1030  Total Billable Time: 25 minutes    Surgery: Left knee anterior cruciate ligament reconstruction with patella tendon autograft 10/27/22  Orthopedic Precautions: Per protocol, scanned into media section of EMR  Pertinent History: None    Subjective     Patient reports: No adverse reports. All is going well.    CMS Impairment/Limitation/Restriction for FOTO Survey  Therapist reviewed FOTO scores for Migue Mistry on 11/1/2022.   FOTO documents entered into EPIC - see Media section.  Patient's Physical FS Primary Measure: 24  Risk Adjusted Statistical FOTO: 46  Limitation Score: 76%  Category: Mobility  LEFS: 9.4/80 = 11.75% functional      Therapist reviewed FOTO scores for Migue Mistry on 11/18/2022.   FOTO documents entered into EPIC - see Media section.  Patient's Physical FS Primary Measure: 44  Risk Adjusted Statistical FOTO: 46  Limitation Score: 56%  Category: Mobility  LEFS: 28.4/80 = 35.50% functional    Therapist reviewed FOTO scores for Migue Mistry on 12/02/2022.   FOTO documents entered into EPIC - see Media section.  Patient's Physical FS Primary Measure: 52  Risk Adjusted Statistical FOTO: 46  Limitation Score: 48%  Category: Mobility  LEFS: 38.7/80 = 48.38% functional    Therapist reviewed FOTO scores for Migue Mistry on 12/16/2022.   FOTO documents entered into EPIC - see Media  section.  Patient's Physical FS Primary Measure: 59  Risk Adjusted Statistical FOTO: 46  Limitation Score: 41%  Category: Mobility  LEFS: 48.2/80 = 60.25% functional      Therapist reviewed FOTO scores for Zahraa Mistry on 01/27/2023.   FOTO documents entered into EPIC - see Media section.  Patient's Physical FS Primary Measure: 68  Risk Adjusted Statistical FOTO: 46  Limitation Score: 32%  Category: Mobility  LEFS: 59.8/80 = 74.75% functional      Objective     ZAHRAA received the following treatment:     Time Activities   Manual  Left knee tibial on femoral internal rotation and posterior drawer grades 1-2 with movement    TherAct  NuStep (low level, high level), bridge with HS curl, jump squats, deadlift squat, standing band hip (flx, abd, ext), planks jumps (LEs side to side)    With all single leg stance activities, 15 deg knee bend     Athletics 25 min Box jumps fwd (2 on 1 off, 1 on 1 off)  Box jumps lat (2 on 1 off, 1 on 1 off)  Lateral hop>take off  Square single leg jump  Diagonal single leg jump  Defense stance>backpedal  Fwd jump distance  Lat jump distance  Lat box jump>lat jump off>takeoff    Bridge on stab ball with HS curl     Gait  Gait with visual cueing via mirror (normal step, high march step to reduce trendelenburg)   Neuro Re-ed  Russian e-stim to VMO and vastus lateralis co-contract with (wall TKE ball squeeze, mini squat holds, single leg stance with mini squat holds)         Home Exercises Provided and Patient Education Provided     Education provided:   -Plan of care, HEP, initiation of light jogging    Assessment     Plyometrics, athletic drills done this session. Increased running speed, jumping distance, cutting speed and fluidity. He is progressing very well. Encouraged him to begin going to the gym for progressive (but cautious) jogging/running.    Will continue to progress into more difficult plyometrics and advance drills. Spoke with him regarding plan of care and when he would  like to transition to independent HEP. He still has a ways to go to reach pre-injury level, especially if progressing along protocol guidelines.    Patient prognosis is Excellent.      Anticipated barriers to physical therapy: None    Goals: ZAHRAA Is progressing well towards his goals.  Short Term Goals: 8 weeks   Patient will report at least 15% increase in functional capacity from initial LEFS score to indicate clinically significant functional improvement (goal met)  Patient will report at least 15 point increase on initial FOTO score to indicate clinically significant functional improvement (goal met)  Patient will demonstrate full day of ambulation without assistive device (goal met)     Long Term Goals: 16 weeks   Patient will report at least 30% increase in functional capacity from initial LEFS score to indicate clinically significant functional improvement (goal met)  Patient will report at least 30 point increase on initial FOTO score to indicate clinically significant functional improvement (goal met)  Patient will jog/run for 15 minutes without left knee pain    Plan     2-3x/week x 12 weeks    Aneta Marrero, PT

## 2023-02-03 ENCOUNTER — CLINICAL SUPPORT (OUTPATIENT)
Dept: REHABILITATION | Facility: HOSPITAL | Age: 23
End: 2023-02-03
Payer: COMMERCIAL

## 2023-02-03 DIAGNOSIS — Z74.09 IMPAIRED FUNCTIONAL MOBILITY, BALANCE, AND ENDURANCE: Primary | ICD-10-CM

## 2023-02-03 PROCEDURE — 97530 THERAPEUTIC ACTIVITIES: CPT

## 2023-02-03 NOTE — PLAN OF CARE
Physical Therapy Treatment Note     Name: Migue Mistry  Clinic Number: [id number]    Therapy Diagnosis:   Encounter Diagnosis   Name Primary?    Impaired functional mobility, balance, and endurance Yes         Physician: Yariel Judge Jr., MD    Visit Date: 2/3/2023    Physician Orders: PT Eval and Treat  Medical Diagnosis from Referral: Left knee anterior cruciate ligament reconstruction with patella tendon autograft  Evaluation Date: 11/1/2022  Authorization Period Expiration: As needed  Plan of Care Expiration: 03/01/2022  Visit # / Visits authorized: 32/ As needed    Time In: 1001  Time Out: 1041  Total Billable Time: 40 minutes    Surgery: Left knee anterior cruciate ligament reconstruction with patella tendon autograft 10/27/22  Orthopedic Precautions: Per protocol, scanned into media section of EMR  Pertinent History: None    Subjective     Patient reports: No adverse reports. All is going well.    CMS Impairment/Limitation/Restriction for FOTO Survey  Therapist reviewed FOTO scores for Migue Mistry on 11/1/2022.   FOTO documents entered into EPIC - see Media section.  Patient's Physical FS Primary Measure: 24  Risk Adjusted Statistical FOTO: 46  Limitation Score: 76%  Category: Mobility  LEFS: 9.4/80 = 11.75% functional      Therapist reviewed FOTO scores for Migue Mistry on 11/18/2022.   FOTO documents entered into EPIC - see Media section.  Patient's Physical FS Primary Measure: 44  Risk Adjusted Statistical FOTO: 46  Limitation Score: 56%  Category: Mobility  LEFS: 28.4/80 = 35.50% functional    Therapist reviewed FOTO scores for Migue Mistry on 12/02/2022.   FOTO documents entered into EPIC - see Media section.  Patient's Physical FS Primary Measure: 52  Risk Adjusted Statistical FOTO: 46  Limitation Score: 48%  Category: Mobility  LEFS: 38.7/80 = 48.38% functional    Therapist reviewed FOTO scores for Migue Mistry on 12/16/2022.   FOTO documents entered into EPIC - see Media  section.  Patient's Physical FS Primary Measure: 59  Risk Adjusted Statistical FOTO: 46  Limitation Score: 41%  Category: Mobility  LEFS: 48.2/80 = 60.25% functional      Therapist reviewed FOTO scores for Zahraa Mistry on 01/27/2023.   FOTO documents entered into [employer] - see Media section.  Patient's Physical FS Primary Measure: 68  Risk Adjusted Statistical FOTO: 46  Limitation Score: 32%  Category: Mobility  LEFS: 59.8/80 = 74.75% functional      Objective     ZAHRAA received the following treatment:     Time Activities   Manual  Left knee tibial on femoral internal rotation and posterior drawer grades 1-2 with movement    TherAct  NuStep (low level, high level), bridge with HS curl, jump squats, deadlift squat, standing band hip (flx, abd, ext), planks jumps (LEs side to side)    With all single leg stance activities, 15 deg knee bend     Athletics 40 min Box jumps fwd (2 on 1 off, 1 on 1 off)  Box jumps lat (2 on 1 off, 1 on 1 off)  Lateral hop>take off  Square single leg jump  Diagonal single leg jump  Defense stance>backpedal  Fwd jump distance  Lat jump distance  Lat box jump>lat jump off>takeoff  Squat jumps onto box (kettlebell)    NuStep     Gait  Gait with visual cueing via mirror (normal step, high march step to reduce trendelenburg)   Neuro Re-ed  Russian e-stim to VMO and vastus lateralis co-contract with (wall TKE ball squeeze, mini squat holds, single leg stance with mini squat holds)         Home Exercises Provided and Patient Education Provided     Education provided:   -Plan of care, HEP, initiation of light jogging    Assessment     Plyometrics, athletic drills done this session. Increased running speed, jumping distance, cutting speed and fluidity. He is progressing very well. Encouraged him to begin going to the gym for progressive (but cautious) jogging/running.    Will continue to progress into more difficult plyometrics and advance drills. Spoke with him regarding plan of care and when he  would like to transition to independent HEP. He still has a ways to go to reach pre-injury level, especially if progressing along protocol guidelines.    Patient prognosis is Excellent.      Anticipated barriers to physical therapy: None    Goals: ZAHRAA Is progressing well towards his goals.  Short Term Goals: 8 weeks   Patient will report at least 15% increase in functional capacity from initial LEFS score to indicate clinically significant functional improvement (goal met)  Patient will report at least 15 point increase on initial FOTO score to indicate clinically significant functional improvement (goal met)  Patient will demonstrate full day of ambulation without assistive device (goal met)     Long Term Goals: 16 weeks   Patient will report at least 30% increase in functional capacity from initial LEFS score to indicate clinically significant functional improvement (goal met)  Patient will report at least 30 point increase on initial FOTO score to indicate clinically significant functional improvement (goal met)  Patient will jog/run for 15 minutes without left knee pain    Plan     2-3x/week x 12 weeks    Aneta Marrero, PT

## 2023-02-15 ENCOUNTER — CLINICAL SUPPORT (OUTPATIENT)
Dept: REHABILITATION | Facility: HOSPITAL | Age: 23
End: 2023-02-15
Payer: COMMERCIAL

## 2023-02-15 DIAGNOSIS — Z74.09 IMPAIRED FUNCTIONAL MOBILITY, BALANCE, AND ENDURANCE: Primary | ICD-10-CM

## 2023-02-15 DIAGNOSIS — R29.898 TRANSIENT LEFT LEG WEAKNESS: ICD-10-CM

## 2023-02-15 PROCEDURE — 97530 THERAPEUTIC ACTIVITIES: CPT

## 2023-02-15 NOTE — PLAN OF CARE
Physical Therapy Treatment Note     Name: Migue Mistry  Clinic Number: [id number]    Therapy Diagnosis:   Encounter Diagnoses   Name Primary?    Impaired functional mobility, balance, and endurance Yes    Transient left leg weakness          Physician: Yariel Judge Jr., MD    Visit Date: 2/15/2023    Physician Orders: PT Eval and Treat  Medical Diagnosis from Referral: Left knee anterior cruciate ligament reconstruction with patella tendon autograft  Evaluation Date: 11/1/2022  Authorization Period Expiration: As needed  Plan of Care Expiration: 03/01/2022  Visit # / Visits authorized: 33/ As needed    Time In: 1006  Time Out: 1035  Total Billable Time: 29 minutes    Surgery: Left knee anterior cruciate ligament reconstruction with patella tendon autograft 10/27/22  Orthopedic Precautions: Per protocol, scanned into media section of EMR  Pertinent History: None    Subjective     Patient reports: No adverse reports. All is going well.    CMS Impairment/Limitation/Restriction for FOTO Survey  Therapist reviewed FOTO scores for Migue Mistry on 11/1/2022.   FOTO documents entered into EPIC - see Media section.  Patient's Physical FS Primary Measure: 24  Risk Adjusted Statistical FOTO: 46  Limitation Score: 76%  Category: Mobility  LEFS: 9.4/80 = 11.75% functional      Therapist reviewed FOTO scores for Migue Mistry on 11/18/2022.   FOTO documents entered into EPIC - see Media section.  Patient's Physical FS Primary Measure: 44  Risk Adjusted Statistical FOTO: 46  Limitation Score: 56%  Category: Mobility  LEFS: 28.4/80 = 35.50% functional    Therapist reviewed FOTO scores for Migue Mistry on 12/02/2022.   FOTO documents entered into EPIC - see Media section.  Patient's Physical FS Primary Measure: 52  Risk Adjusted Statistical FOTO: 46  Limitation Score: 48%  Category: Mobility  LEFS: 38.7/80 = 48.38% functional    Therapist reviewed FOTO scores for Migue Mistry on 12/16/2022.   FOTO documents  entered into EPIC - see Media section.  Patient's Physical FS Primary Measure: 59  Risk Adjusted Statistical FOTO: 46  Limitation Score: 41%  Category: Mobility  LEFS: 48.2/80 = 60.25% functional      Therapist reviewed FOTO scores for Zahraa Mistry on 01/27/2023.   FOTO documents entered into EPIC - see Media section.  Patient's Physical FS Primary Measure: 68  Risk Adjusted Statistical FOTO: 46  Limitation Score: 32%  Category: Mobility  LEFS: 59.8/80 = 74.75% functional      Objective     ZAHRAA received the following treatment:     Time Activities   Manual  Left knee tibial on femoral internal rotation and posterior drawer grades 1-2 with movement    TherAct  NuStep (low level, high level), bridge with HS curl, jump squats, deadlift squat, standing band hip (flx, abd, ext), planks jumps (LEs side to side)    With all single leg stance activities, 15 deg knee bend     Athletics 29 min Box jumps fwd (2 on 1 off, 1 on 1 off)  Box jumps lat (2 on 1 off, 1 on 1 off)  Lateral hop>take off  Square single leg jump  Diagonal single leg jump  Defense stance>backpedal  Fwd jump distance  Lat jump distance  Lat box jump>lat jump off>takeoff  Squat jumps onto box (kettlebell)    NuStep     Gait  Gait with visual cueing via mirror (normal step, high march step to reduce trendelenburg)   Neuro Re-ed  Russian e-stim to VMO and vastus lateralis co-contract with (wall TKE ball squeeze, mini squat holds, single leg stance with mini squat holds)         Home Exercises Provided and Patient Education Provided     Education provided:   -Plan of care, HEP, initiation of light jogging    Assessment     Plyometrics, athletic drills done this session. Increased running speed, jumping distance, cutting speed and fluidity. He is progressing very well. Encouraged him to begin going to the gym for progressive (but cautious) jogging/running.    Will continue to progress into more difficult plyometrics and advance drills. Spoke with him  regarding plan of care and when he would like to transition to independent HEP. He still has a ways to go to reach pre-injury level, especially if progressing along protocol guidelines.    Patient prognosis is Excellent.      Anticipated barriers to physical therapy: None    Goals: ZAHRAA Is progressing well towards his goals.  Short Term Goals: 8 weeks   Patient will report at least 15% increase in functional capacity from initial LEFS score to indicate clinically significant functional improvement (goal met)  Patient will report at least 15 point increase on initial FOTO score to indicate clinically significant functional improvement (goal met)  Patient will demonstrate full day of ambulation without assistive device (goal met)     Long Term Goals: 16 weeks   Patient will report at least 30% increase in functional capacity from initial LEFS score to indicate clinically significant functional improvement (goal met)  Patient will report at least 30 point increase on initial FOTO score to indicate clinically significant functional improvement (goal met)  Patient will jog/run for 15 minutes without left knee pain    Plan     2-3x/week x 12 weeks    Aneta Marrero, PT

## 2023-02-17 ENCOUNTER — CLINICAL SUPPORT (OUTPATIENT)
Dept: REHABILITATION | Facility: HOSPITAL | Age: 23
End: 2023-02-17
Payer: COMMERCIAL

## 2023-02-17 DIAGNOSIS — Z74.09 IMPAIRED FUNCTIONAL MOBILITY, BALANCE, AND ENDURANCE: Primary | ICD-10-CM

## 2023-02-17 PROCEDURE — 97530 THERAPEUTIC ACTIVITIES: CPT

## 2023-02-17 NOTE — PLAN OF CARE
Physical Therapy Treatment Note     Name: Migue Mistry  Clinic Number: [id number]    Therapy Diagnosis:   Encounter Diagnosis   Name Primary?    Impaired functional mobility, balance, and endurance Yes         Physician: Yariel Judge Jr., MD    Visit Date: 2/17/2023    Physician Orders: PT Eval and Treat  Medical Diagnosis from Referral: Left knee anterior cruciate ligament reconstruction with patella tendon autograft  Evaluation Date: 11/1/2022  Authorization Period Expiration: As needed  Plan of Care Expiration: 03/01/2022  Visit # / Visits authorized: 33/ As needed    Time In: 1005  Time Out: 1037  Total Billable Time: 32 minutes    Surgery: Left knee anterior cruciate ligament reconstruction with patella tendon autograft 10/27/22  Orthopedic Precautions: Per protocol, scanned into media section of EMR  Pertinent History: None    Subjective     Patient reports: No adverse reports. All is going well.    CMS Impairment/Limitation/Restriction for FOTO Survey  Therapist reviewed FOTO scores for Migue Mistry on 11/1/2022.   FOTO documents entered into EPIC - see Media section.  Patient's Physical FS Primary Measure: 24  Risk Adjusted Statistical FOTO: 46  Limitation Score: 76%  Category: Mobility  LEFS: 9.4/80 = 11.75% functional      Therapist reviewed FOTO scores for Migue Mistry on 11/18/2022.   FOTO documents entered into EPIC - see Media section.  Patient's Physical FS Primary Measure: 44  Risk Adjusted Statistical FOTO: 46  Limitation Score: 56%  Category: Mobility  LEFS: 28.4/80 = 35.50% functional    Therapist reviewed FOTO scores for Migue Mistry on 12/02/2022.   FOTO documents entered into EPIC - see Media section.  Patient's Physical FS Primary Measure: 52  Risk Adjusted Statistical FOTO: 46  Limitation Score: 48%  Category: Mobility  LEFS: 38.7/80 = 48.38% functional    Therapist reviewed FOTO scores for Migue Mistry on 12/16/2022.   FOTO documents entered into EPIC - see Media  section.  Patient's Physical FS Primary Measure: 59  Risk Adjusted Statistical FOTO: 46  Limitation Score: 41%  Category: Mobility  LEFS: 48.2/80 = 60.25% functional      Therapist reviewed FOTO scores for Zahraa Mistry on 01/27/2023.   FOTO documents entered into EPIC - see Media section.  Patient's Physical FS Primary Measure: 68  Risk Adjusted Statistical FOTO: 46  Limitation Score: 32%  Category: Mobility  LEFS: 59.8/80 = 74.75% functional      Objective     ZAHRAA received the following treatment:     Time Activities   Manual  Left knee tibial on femoral internal rotation and posterior drawer grades 1-2 with movement    TherAct  NuStep (low level, high level), bridge with HS curl, jump squats, deadlift squat, standing band hip (flx, abd, ext), planks jumps (LEs side to side)    With all single leg stance activities, 15 deg knee bend     Athletics 32 min Box jumps fwd (2 on 1 off, 1 on 1 off)  Box jumps lat (2 on 1 off, 1 on 1 off)  Lateral hop>take off  Square single leg jump  Diagonal single leg jump  Defense stance>backpedal  Fwd jump distance  Lat jump distance  Lat box jump>lat jump off>takeoff  Squat jumps onto box (kettlebell)  Chair jumps  B[facility] high knee step up jumps    NuStep     Gait  Gait with visual cueing via mirror (normal step, high march step to reduce trendelenburg)   Neuro Re-ed  Russian e-stim to VMO and vastus lateralis co-contract with (wall TKE ball squeeze, mini squat holds, single leg stance with mini squat holds)         Home Exercises Provided and Patient Education Provided     Education provided:   -Plan of care, HEP, initiation of light jogging    Assessment     Plyometrics, athletic drills done this session. Increased running speed, jumping distance, cutting speed and fluidity. He is progressing very well. Encouraged him to begin going to the gym for progressive (but cautious) jogging/running.    Will continue to progress into more difficult plyometrics and advance drills. Spoke  with him regarding plan of care and when he would like to transition to independent HEP. He still has a ways to go to reach pre-injury level, especially if progressing along protocol guidelines.    Patient prognosis is Excellent.      Anticipated barriers to physical therapy: None    Goals: ZAHRAA Is progressing well towards his goals.  Short Term Goals: 8 weeks   Patient will report at least 15% increase in functional capacity from initial LEFS score to indicate clinically significant functional improvement (goal met)  Patient will report at least 15 point increase on initial FOTO score to indicate clinically significant functional improvement (goal met)  Patient will demonstrate full day of ambulation without assistive device (goal met)     Long Term Goals: 16 weeks   Patient will report at least 30% increase in functional capacity from initial LEFS score to indicate clinically significant functional improvement (goal met)  Patient will report at least 30 point increase on initial FOTO score to indicate clinically significant functional improvement (goal met)  Patient will jog/run for 15 minutes without left knee pain    Plan     2-3x/week x 12 weeks    Aneta Marrero, PT

## 2024-04-20 ENCOUNTER — HOSPITAL ENCOUNTER (EMERGENCY)
Facility: HOSPITAL | Age: 24
Discharge: ANOTHER HOSPITAL | End: 2024-04-21
Attending: STUDENT IN AN ORGANIZED HEALTH CARE EDUCATION/TRAINING PROGRAM
Payer: COMMERCIAL

## 2024-04-20 DIAGNOSIS — I31.9 MYOPERICARDITIS: Primary | ICD-10-CM

## 2024-04-20 DIAGNOSIS — R07.9 CHEST PAIN: ICD-10-CM

## 2024-04-20 LAB
ALBUMIN SERPL-MCNC: 3.9 G/DL (ref 3.5–5)
ALBUMIN/GLOB SERPL: 1.4 RATIO (ref 1.1–2)
ALP SERPL-CCNC: 83 UNIT/L (ref 40–150)
ALT SERPL-CCNC: 37 UNIT/L (ref 0–55)
AST SERPL-CCNC: 101 UNIT/L (ref 5–34)
BASOPHILS # BLD AUTO: 0.02 X10(3)/MCL
BASOPHILS NFR BLD AUTO: 0.3 %
BILIRUB SERPL-MCNC: 0.5 MG/DL
BNP BLD-MCNC: 93.2 PG/ML
BUN SERPL-MCNC: 9 MG/DL (ref 8.9–20.6)
CALCIUM SERPL-MCNC: 9.2 MG/DL (ref 8.4–10.2)
CHLORIDE SERPL-SCNC: 106 MMOL/L (ref 98–107)
CO2 SERPL-SCNC: 27 MMOL/L (ref 22–29)
CREAT SERPL-MCNC: 1.02 MG/DL (ref 0.73–1.18)
EOSINOPHIL # BLD AUTO: 0.09 X10(3)/MCL (ref 0–0.9)
EOSINOPHIL NFR BLD AUTO: 1.4 %
ERYTHROCYTE [DISTWIDTH] IN BLOOD BY AUTOMATED COUNT: 12.2 % (ref 11.5–17)
GFR SERPLBLD CREATININE-BSD FMLA CKD-EPI: >60 MLS/MIN/1.73/M2
GLOBULIN SER-MCNC: 2.7 GM/DL (ref 2.4–3.5)
GLUCOSE SERPL-MCNC: 106 MG/DL (ref 74–100)
HCT VFR BLD AUTO: 44.1 % (ref 42–52)
HGB BLD-MCNC: 15.1 G/DL (ref 14–18)
IMM GRANULOCYTES # BLD AUTO: 0.05 X10(3)/MCL (ref 0–0.04)
IMM GRANULOCYTES NFR BLD AUTO: 0.8 %
LYMPHOCYTES # BLD AUTO: 1.53 X10(3)/MCL (ref 0.6–4.6)
LYMPHOCYTES NFR BLD AUTO: 23.8 %
MCH RBC QN AUTO: 33.2 PG (ref 27–31)
MCHC RBC AUTO-ENTMCNC: 34.2 G/DL (ref 33–36)
MCV RBC AUTO: 96.9 FL (ref 80–94)
MONOCYTES # BLD AUTO: 1.01 X10(3)/MCL (ref 0.1–1.3)
MONOCYTES NFR BLD AUTO: 15.7 %
NEUTROPHILS # BLD AUTO: 3.72 X10(3)/MCL (ref 2.1–9.2)
NEUTROPHILS NFR BLD AUTO: 58 %
NRBC BLD AUTO-RTO: 0 %
PLATELET # BLD AUTO: 183 X10(3)/MCL (ref 130–400)
PMV BLD AUTO: 9.5 FL (ref 7.4–10.4)
POTASSIUM SERPL-SCNC: 4.8 MMOL/L (ref 3.5–5.1)
PROT SERPL-MCNC: 6.6 GM/DL (ref 6.4–8.3)
RBC # BLD AUTO: 4.55 X10(6)/MCL (ref 4.7–6.1)
SODIUM SERPL-SCNC: 142 MMOL/L (ref 136–145)
TROPONIN I SERPL-MCNC: 11.36 NG/ML (ref 0–0.04)
WBC # SPEC AUTO: 6.42 X10(3)/MCL (ref 4.5–11.5)

## 2024-04-20 PROCEDURE — 99900031 HC PATIENT EDUCATION (STAT)

## 2024-04-20 PROCEDURE — 80053 COMPREHEN METABOLIC PANEL: CPT | Performed by: STUDENT IN AN ORGANIZED HEALTH CARE EDUCATION/TRAINING PROGRAM

## 2024-04-20 PROCEDURE — 84484 ASSAY OF TROPONIN QUANT: CPT | Performed by: STUDENT IN AN ORGANIZED HEALTH CARE EDUCATION/TRAINING PROGRAM

## 2024-04-20 PROCEDURE — 25000003 PHARM REV CODE 250: Performed by: STUDENT IN AN ORGANIZED HEALTH CARE EDUCATION/TRAINING PROGRAM

## 2024-04-20 PROCEDURE — 86140 C-REACTIVE PROTEIN: CPT | Performed by: STUDENT IN AN ORGANIZED HEALTH CARE EDUCATION/TRAINING PROGRAM

## 2024-04-20 PROCEDURE — 85025 COMPLETE CBC W/AUTO DIFF WBC: CPT | Performed by: STUDENT IN AN ORGANIZED HEALTH CARE EDUCATION/TRAINING PROGRAM

## 2024-04-20 PROCEDURE — 93005 ELECTROCARDIOGRAM TRACING: CPT

## 2024-04-20 PROCEDURE — 99285 EMERGENCY DEPT VISIT HI MDM: CPT | Mod: 25

## 2024-04-20 PROCEDURE — 85652 RBC SED RATE AUTOMATED: CPT | Performed by: STUDENT IN AN ORGANIZED HEALTH CARE EDUCATION/TRAINING PROGRAM

## 2024-04-20 PROCEDURE — 83880 ASSAY OF NATRIURETIC PEPTIDE: CPT | Performed by: STUDENT IN AN ORGANIZED HEALTH CARE EDUCATION/TRAINING PROGRAM

## 2024-04-20 RX ORDER — ASPIRIN 325 MG
325 TABLET ORAL
Status: COMPLETED | OUTPATIENT
Start: 2024-04-20 | End: 2024-04-20

## 2024-04-20 RX ADMIN — ASPIRIN 325 MG ORAL TABLET 325 MG: 325 PILL ORAL at 10:04

## 2024-04-21 ENCOUNTER — HOSPITAL ENCOUNTER (INPATIENT)
Facility: HOSPITAL | Age: 24
LOS: 1 days | Discharge: HOME | DRG: 281 | End: 2024-04-22
Attending: EMERGENCY MEDICINE | Admitting: INTERNAL MEDICINE
Payer: COMMERCIAL

## 2024-04-21 VITALS
OXYGEN SATURATION: 98 % | TEMPERATURE: 98 F | RESPIRATION RATE: 16 BRPM | SYSTOLIC BLOOD PRESSURE: 131 MMHG | DIASTOLIC BLOOD PRESSURE: 77 MMHG | WEIGHT: 190 LBS | BODY MASS INDEX: 28.14 KG/M2 | HEART RATE: 77 BPM | HEIGHT: 69 IN

## 2024-04-21 DIAGNOSIS — I30.9 ACUTE PERICARDITIS: ICD-10-CM

## 2024-04-21 DIAGNOSIS — I25.10 CAD (CORONARY ARTERY DISEASE): ICD-10-CM

## 2024-04-21 DIAGNOSIS — R79.89 TROPONIN LEVEL ELEVATED: Primary | ICD-10-CM

## 2024-04-21 DIAGNOSIS — I21.3 STEMI (ST ELEVATION MYOCARDIAL INFARCTION): ICD-10-CM

## 2024-04-21 LAB
AMPHET UR QL SCN: NEGATIVE
APPEARANCE UR: CLEAR
AV INDEX (PROSTH): 0.86
AV MEAN GRADIENT: 3 MMHG
AV PEAK GRADIENT: 5 MMHG
AV VALVE AREA BY VELOCITY RATIO: 3.13 CM²
AV VALVE AREA: 2.96 CM²
AV VELOCITY RATIO: 0.9
BACTERIA #/AREA URNS AUTO: NORMAL /HPF
BARBITURATE SCN PRESENT UR: NEGATIVE
BENZODIAZ UR QL SCN: NEGATIVE
BILIRUB UR QL STRIP.AUTO: NEGATIVE
BSA FOR ECHO PROCEDURE: 2.05 M2
CANNABINOIDS UR QL SCN: NEGATIVE
CK MB SERPL-MCNC: 54.7 NG/ML
COCAINE UR QL SCN: NEGATIVE
COLOR UR AUTO: NORMAL
CRP SERPL-MCNC: 12.3 MG/L
CRP SERPL-MCNC: 14.5 MG/L
CV ECHO LV RWT: 0.41 CM
DOP CALC AO PEAK VEL: 1.14 M/S
DOP CALC AO VTI: 24.2 CM
DOP CALC LVOT AREA: 3.5 CM2
DOP CALC LVOT DIAMETER: 2.1 CM
DOP CALC LVOT PEAK VEL: 1.03 M/S
DOP CALC LVOT STROKE VOLUME: 71.66 CM3
DOP CALC MV VTI: 27.1 CM
DOP CALCLVOT PEAK VEL VTI: 20.7 CM
E WAVE DECELERATION TIME: 156 MSEC
E/A RATIO: 1.56
E/E' RATIO: 7.36 M/S
ECHO LV POSTERIOR WALL: 1 CM (ref 0.6–1.1)
ERYTHROCYTE [SEDIMENTATION RATE] IN BLOOD: 4 MM/HR (ref 0–15)
ERYTHROCYTE [SEDIMENTATION RATE] IN BLOOD: <1 MM/HR (ref 0–15)
FENTANYL UR QL SCN: NEGATIVE
FLUAV AG UPPER RESP QL IA.RAPID: NOT DETECTED
FLUBV AG UPPER RESP QL IA.RAPID: NOT DETECTED
FRACTIONAL SHORTENING: 27 % (ref 28–44)
GLUCOSE UR QL STRIP.AUTO: NORMAL
INTERVENTRICULAR SEPTUM: 0.93 CM (ref 0.6–1.1)
KETONES UR QL STRIP.AUTO: NEGATIVE
LEFT ATRIUM SIZE: 3.8 CM
LEFT ATRIUM VOLUME INDEX MOD: 27 ML/M2
LEFT ATRIUM VOLUME MOD: 54.8 CM3
LEFT INTERNAL DIMENSION IN SYSTOLE: 3.57 CM (ref 2.1–4)
LEFT VENTRICLE DIASTOLIC VOLUME INDEX: 55.67 ML/M2
LEFT VENTRICLE DIASTOLIC VOLUME: 113 ML
LEFT VENTRICLE MASS INDEX: 83 G/M2
LEFT VENTRICLE SYSTOLIC VOLUME INDEX: 26.3 ML/M2
LEFT VENTRICLE SYSTOLIC VOLUME: 53.3 ML
LEFT VENTRICULAR INTERNAL DIMENSION IN DIASTOLE: 4.91 CM (ref 3.5–6)
LEFT VENTRICULAR MASS: 168.37 G
LEUKOCYTE ESTERASE UR QL STRIP.AUTO: NEGATIVE
LV LATERAL E/E' RATIO: 6.57 M/S
LV SEPTAL E/E' RATIO: 8.36 M/S
LVOT MG: 2 MMHG
LVOT MV: 0.68 CM/S
MDMA UR QL SCN: NEGATIVE
MV MEAN GRADIENT: 2 MMHG
MV PEAK A VEL: 0.59 M/S
MV PEAK E VEL: 0.92 M/S
MV PEAK GRADIENT: 4 MMHG
MV VALVE AREA BY CONTINUITY EQUATION: 2.64 CM2
NITRITE UR QL STRIP.AUTO: NEGATIVE
OHS CV RV/LV RATIO: 0.64 CM
OHS LV EJECTION FRACTION SIMPSONS BIPLANE MOD: 51 %
OHS QRS DURATION: 88 MS
OHS QRS DURATION: 90 MS
OHS QRS DURATION: 94 MS
OHS QRS DURATION: 98 MS
OHS QTC CALCULATION: 380 MS
OHS QTC CALCULATION: 382 MS
OHS QTC CALCULATION: 384 MS
OHS QTC CALCULATION: 390 MS
OPIATES UR QL SCN: NEGATIVE
PCP UR QL: NEGATIVE
PH UR STRIP.AUTO: 6 [PH]
PH UR: 6 [PH] (ref 3–11)
PROT UR QL STRIP.AUTO: NEGATIVE
RA MAJOR: 5.03 CM
RA PRESSURE ESTIMATED: 3 MMHG
RA WIDTH: 3.35 CM
RBC #/AREA URNS AUTO: NORMAL /HPF
RBC UR QL AUTO: NEGATIVE
RIGHT VENTRICULAR END-DIASTOLIC DIMENSION: 3.14 CM
SARS-COV-2 RNA RESP QL NAA+PROBE: NOT DETECTED
SP GR UR STRIP.AUTO: 1.01 (ref 1–1.03)
SPECIFIC GRAVITY, URINE AUTO (.000) (OHS): 1.01 (ref 1–1.03)
SQUAMOUS #/AREA URNS LPF: NORMAL /HPF
TDI LATERAL: 0.14 M/S
TDI SEPTAL: 0.11 M/S
TDI: 0.13 M/S
TRICUSPID ANNULAR PLANE SYSTOLIC EXCURSION: 2.15 CM
TROPONIN I SERPL-MCNC: 22.44 NG/ML (ref 0–0.04)
TROPONIN I SERPL-MCNC: 23.39 NG/ML (ref 0–0.04)
TROPONIN I SERPL-MCNC: 25.34 NG/ML (ref 0–0.04)
UROBILINOGEN UR STRIP-ACNC: NORMAL
WBC #/AREA URNS AUTO: NORMAL /HPF
Z-SCORE OF LEFT VENTRICULAR DIMENSION IN END DIASTOLE: -2.03
Z-SCORE OF LEFT VENTRICULAR DIMENSION IN END SYSTOLE: -0.26

## 2024-04-21 PROCEDURE — 99152 MOD SED SAME PHYS/QHP 5/>YRS: CPT | Performed by: INTERNAL MEDICINE

## 2024-04-21 PROCEDURE — 25500020 PHARM REV CODE 255: Performed by: INTERNAL MEDICINE

## 2024-04-21 PROCEDURE — 81001 URINALYSIS AUTO W/SCOPE: CPT | Mod: XB | Performed by: NURSE PRACTITIONER

## 2024-04-21 PROCEDURE — 4A023N7 MEASUREMENT OF CARDIAC SAMPLING AND PRESSURE, LEFT HEART, PERCUTANEOUS APPROACH: ICD-10-PCS | Performed by: NURSE PRACTITIONER

## 2024-04-21 PROCEDURE — 80307 DRUG TEST PRSMV CHEM ANLYZR: CPT | Performed by: NURSE PRACTITIONER

## 2024-04-21 PROCEDURE — C1894 INTRO/SHEATH, NON-LASER: HCPCS | Performed by: INTERNAL MEDICINE

## 2024-04-21 PROCEDURE — 25000003 PHARM REV CODE 250: Performed by: INTERNAL MEDICINE

## 2024-04-21 PROCEDURE — 86140 C-REACTIVE PROTEIN: CPT | Performed by: INTERNAL MEDICINE

## 2024-04-21 PROCEDURE — 0240U COVID/FLU A&B PCR: CPT | Performed by: NURSE PRACTITIONER

## 2024-04-21 PROCEDURE — 84484 ASSAY OF TROPONIN QUANT: CPT | Performed by: EMERGENCY MEDICINE

## 2024-04-21 PROCEDURE — B2111ZZ FLUOROSCOPY OF MULTIPLE CORONARY ARTERIES USING LOW OSMOLAR CONTRAST: ICD-10-PCS | Performed by: NURSE PRACTITIONER

## 2024-04-21 PROCEDURE — 93005 ELECTROCARDIOGRAM TRACING: CPT

## 2024-04-21 PROCEDURE — 94760 N-INVAS EAR/PLS OXIMETRY 1: CPT

## 2024-04-21 PROCEDURE — 63600175 PHARM REV CODE 636 W HCPCS: Performed by: INTERNAL MEDICINE

## 2024-04-21 PROCEDURE — 99285 EMERGENCY DEPT VISIT HI MDM: CPT | Mod: 25

## 2024-04-21 PROCEDURE — 21400001 HC TELEMETRY ROOM

## 2024-04-21 PROCEDURE — 93458 L HRT ARTERY/VENTRICLE ANGIO: CPT | Performed by: INTERNAL MEDICINE

## 2024-04-21 PROCEDURE — 85652 RBC SED RATE AUTOMATED: CPT | Performed by: INTERNAL MEDICINE

## 2024-04-21 PROCEDURE — B2151ZZ FLUOROSCOPY OF LEFT HEART USING LOW OSMOLAR CONTRAST: ICD-10-PCS | Performed by: NURSE PRACTITIONER

## 2024-04-21 PROCEDURE — 25000003 PHARM REV CODE 250: Performed by: EMERGENCY MEDICINE

## 2024-04-21 PROCEDURE — C1887 CATHETER, GUIDING: HCPCS | Performed by: INTERNAL MEDICINE

## 2024-04-21 PROCEDURE — 82553 CREATINE MB FRACTION: CPT | Performed by: INTERNAL MEDICINE

## 2024-04-21 PROCEDURE — 27000221 HC OXYGEN, UP TO 24 HOURS

## 2024-04-21 PROCEDURE — 93010 ELECTROCARDIOGRAM REPORT: CPT | Mod: ,,, | Performed by: INTERNAL MEDICINE

## 2024-04-21 RX ORDER — PANTOPRAZOLE SODIUM 40 MG/1
40 TABLET, DELAYED RELEASE ORAL DAILY
Status: DISCONTINUED | OUTPATIENT
Start: 2024-04-21 | End: 2024-04-22 | Stop reason: HOSPADM

## 2024-04-21 RX ORDER — FENTANYL CITRATE 50 UG/ML
INJECTION, SOLUTION INTRAMUSCULAR; INTRAVENOUS
Status: DISCONTINUED | OUTPATIENT
Start: 2024-04-21 | End: 2024-04-21 | Stop reason: HOSPADM

## 2024-04-21 RX ORDER — TALC
6 POWDER (GRAM) TOPICAL NIGHTLY PRN
Status: DISCONTINUED | OUTPATIENT
Start: 2024-04-21 | End: 2024-04-22 | Stop reason: HOSPADM

## 2024-04-21 RX ORDER — HEPARIN SODIUM 1000 [USP'U]/ML
INJECTION, SOLUTION INTRAVENOUS; SUBCUTANEOUS
Status: DISCONTINUED | OUTPATIENT
Start: 2024-04-21 | End: 2024-04-21 | Stop reason: HOSPADM

## 2024-04-21 RX ORDER — MIDAZOLAM HYDROCHLORIDE 1 MG/ML
INJECTION INTRAMUSCULAR; INTRAVENOUS
Status: DISCONTINUED | OUTPATIENT
Start: 2024-04-21 | End: 2024-04-21 | Stop reason: HOSPADM

## 2024-04-21 RX ORDER — MORPHINE SULFATE 4 MG/ML
2 INJECTION, SOLUTION INTRAMUSCULAR; INTRAVENOUS EVERY 4 HOURS PRN
Status: DISCONTINUED | OUTPATIENT
Start: 2024-04-21 | End: 2024-04-22 | Stop reason: HOSPADM

## 2024-04-21 RX ORDER — LIDOCAINE HYDROCHLORIDE 10 MG/ML
INJECTION INFILTRATION; PERINEURAL
Status: DISCONTINUED | OUTPATIENT
Start: 2024-04-21 | End: 2024-04-21 | Stop reason: HOSPADM

## 2024-04-21 RX ORDER — SODIUM CHLORIDE 9 MG/ML
INJECTION, SOLUTION INTRAVENOUS CONTINUOUS
Status: DISCONTINUED | OUTPATIENT
Start: 2024-04-21 | End: 2024-04-21

## 2024-04-21 RX ORDER — IBUPROFEN 600 MG/1
600 TABLET ORAL 3 TIMES DAILY
Status: DISCONTINUED | OUTPATIENT
Start: 2024-04-21 | End: 2024-04-22 | Stop reason: HOSPADM

## 2024-04-21 RX ORDER — ACETAMINOPHEN 325 MG/1
650 TABLET ORAL EVERY 8 HOURS PRN
Status: DISCONTINUED | OUTPATIENT
Start: 2024-04-21 | End: 2024-04-22 | Stop reason: HOSPADM

## 2024-04-21 RX ORDER — MORPHINE SULFATE 4 MG/ML
4 INJECTION, SOLUTION INTRAMUSCULAR; INTRAVENOUS EVERY 4 HOURS PRN
Status: DISCONTINUED | OUTPATIENT
Start: 2024-04-21 | End: 2024-04-22 | Stop reason: HOSPADM

## 2024-04-21 RX ORDER — VERAPAMIL HYDROCHLORIDE 2.5 MG/ML
INJECTION, SOLUTION INTRAVENOUS
Status: DISCONTINUED | OUTPATIENT
Start: 2024-04-21 | End: 2024-04-21 | Stop reason: HOSPADM

## 2024-04-21 RX ORDER — ONDANSETRON HYDROCHLORIDE 2 MG/ML
4 INJECTION, SOLUTION INTRAVENOUS EVERY 8 HOURS PRN
Status: DISCONTINUED | OUTPATIENT
Start: 2024-04-21 | End: 2024-04-22 | Stop reason: HOSPADM

## 2024-04-21 RX ORDER — FAMOTIDINE 10 MG/ML
20 INJECTION INTRAVENOUS EVERY 12 HOURS
Status: DISCONTINUED | OUTPATIENT
Start: 2024-04-21 | End: 2024-04-21

## 2024-04-21 RX ORDER — SODIUM CHLORIDE 9 MG/ML
INJECTION, SOLUTION INTRAVENOUS CONTINUOUS
Status: ACTIVE | OUTPATIENT
Start: 2024-04-21 | End: 2024-04-21

## 2024-04-21 RX ORDER — NITROGLYCERIN 20 MG/100ML
INJECTION INTRAVENOUS
Status: DISCONTINUED | OUTPATIENT
Start: 2024-04-21 | End: 2024-04-21 | Stop reason: HOSPADM

## 2024-04-21 RX ORDER — PROCHLORPERAZINE EDISYLATE 5 MG/ML
5 INJECTION INTRAMUSCULAR; INTRAVENOUS EVERY 6 HOURS PRN
Status: DISCONTINUED | OUTPATIENT
Start: 2024-04-21 | End: 2024-04-22 | Stop reason: HOSPADM

## 2024-04-21 RX ORDER — COLCHICINE 0.6 MG/1
0.6 TABLET, FILM COATED ORAL 2 TIMES DAILY
Status: DISCONTINUED | OUTPATIENT
Start: 2024-04-21 | End: 2024-04-22 | Stop reason: HOSPADM

## 2024-04-21 RX ADMIN — SODIUM CHLORIDE: 9 INJECTION, SOLUTION INTRAVENOUS at 01:04

## 2024-04-21 RX ADMIN — FAMOTIDINE 20 MG: 10 INJECTION, SOLUTION INTRAVENOUS at 09:04

## 2024-04-21 RX ADMIN — PANTOPRAZOLE SODIUM 40 MG: 40 TABLET, DELAYED RELEASE ORAL at 01:04

## 2024-04-21 RX ADMIN — COLCHICINE 0.6 MG: 0.6 CAPSULE ORAL at 08:04

## 2024-04-21 RX ADMIN — IBUPROFEN 600 MG: 600 TABLET, FILM COATED ORAL at 01:04

## 2024-04-21 RX ADMIN — IBUPROFEN 600 MG: 600 TABLET, FILM COATED ORAL at 08:04

## 2024-04-21 RX ADMIN — SODIUM CHLORIDE: 9 INJECTION, SOLUTION INTRAVENOUS at 03:04

## 2024-04-21 RX ADMIN — COLCHICINE 0.6 MG: 0.6 CAPSULE ORAL at 01:04

## 2024-04-21 NOTE — PROGRESS NOTES
25 yo male who is unknown to [facility] was transferred from [facility] ER for suspected pericarditis vs myocarditis. He presented there and Patient stated Friday night 04/19/2024 he felt some chest pain described as pressure heaviness and felt short of breath states it resolved he went to sleep did not think much of it. States the next day he went to work felt fine all day until last night before going to bed he felt severe heaviness in the center of his chest and felt short of breath again. He states it felt better whenever he would lean forward. Patient went to the emergency department in [place] had an EKG concerning for pericarditis with diffuse ST segment elevation and [place] depression no reciprocal ST changes. Patient's EKG and history concerning for pericarditis but had elevated troponin at 11 which brought consideration possible myocarditis as well. Patient was given aspirin there and transferred to [facility] for cardiology services. He is currently CP free. Will obtain echocardiogram this morning and further recommendations will be provided by rounding team.

## 2024-04-21 NOTE — INTERVAL H&P NOTE
EMERGENCY DEPARTMENT ENCOUNTER    Pt Name: Caro Adams  MRN: [id number]  Pt :   1971  Room Number:    Date of encounter:  2023  PCP: Xi Zaidi APRN  ED Provider: Titus Rosario MD    Historian: Patient      HPI:  Chief Complaint: Chest discomfort        Context: Caro Adams is a 52 y.o. female who presents to the ED c/o chest discomfort which has been intermittent over the last 4 days.  The longest episode lasted no more than 3 minutes.  She does describe a squeezing sensation in the left anterior chest and notes some discomfort in her left neck.  At its worst is 5 out of 10 on the pain scale.  This has not been accompanied by diaphoresis or nausea.  The patient has undergone a stress test within the last 2 years which she reports that was read as normal of the [facility].  She also reports a normal calcium score by CT scan earlier this year.  She does not smoke and rarely drinks alcohol.  No recreational drug use.  She does have a strong family history with multiple family members positive for stents and MIs.  Her father began having coronary artery disease in his 40s per her report.  She has no history of DVT or PE.  No hormone replacement therapy.  No long distance travel.  No unilateral leg swelling.  She denies infectious symptoms as well.      PAST MEDICAL HISTORY  Past Medical History:   Diagnosis Date    Anxiety     Hypertension     Knee pain          PAST SURGICAL HISTORY  Past Surgical History:   Procedure Laterality Date    ADENOIDECTOMY      BREAST IMPLANT SURGERY      CHOLECYSTECTOMY      HYSTERECTOMY      TONSILLECTOMY           FAMILY HISTORY  History reviewed. No pertinent family history.      SOCIAL HISTORY  Social History     Socioeconomic History    Marital status:    Tobacco Use    Smoking status: Never   Substance and Sexual Activity    Alcohol use: No    Drug use: No         ALLERGIES  Patient has no known allergies.        REVIEW OF  The patient has been examined and the H&P has been reviewed:    I concur with the findings and no changes have occurred since H&P was written.    Procedure risks, benefits and alternative options discussed and understood by patient/family.    There are no hospital problems to display for this patient.     SYSTEMS  Review of Systems       All systems reviewed and negative except for those discussed in HPI.       PHYSICAL EXAM    I have reviewed the triage vital signs and nursing notes.    ED Triage Vitals [08/23/23 1138]   Temp Heart Rate Resp BP SpO2   97.8 øF (36.6 øC) 82 18 124/94 97 %      Temp src Heart Rate Source Patient Position BP Location FiO2 (%)   Oral Monitor Sitting Left arm --       Physical Exam  GENERAL:   Appears in no acute distress.  Very pleasant.  I initially evaluate her in our triage area.  HENT: Nares patent.  EYES: No scleral icterus.  CV: Regular rhythm, regular rate.  No murmurs gallops rubs  RESPIRATORY: Normal effort.  No audible wheezes, rales or rhonchi.  Clear to auscultation  ABDOMEN: Soft, nontender with no reproduction of chest discomfort when I palpate the epigastric region.  MUSCULOSKELETAL: No deformities.  No reproducible chest wall tenderness.  NEURO: Alert, moves all extremities, follows commands.  SKIN: Warm, dry, no rash visualized.      LAB RESULTS  Recent Results (from the past 24 hour(s))   ECG 12 Lead ED Triage Standing Order; Chest Pain    Collection Time: 08/23/23 11:45 AM   Result Value Ref Range    QT Interval 378 ms    QTC Interval 405 ms   High Sensitivity Troponin T    Collection Time: 08/23/23 12:46 PM    Specimen: Blood   Result Value Ref Range    HS Troponin T 8 <10 ng/L   Comprehensive Metabolic Panel    Collection Time: 08/23/23 12:46 PM    Specimen: Blood   Result Value Ref Range    Glucose 82 65 - 99 mg/dL    BUN 11 6 - 20 mg/dL    Creatinine 0.86 0.57 - 1.00 mg/dL    Sodium 139 136 - 145 mmol/L    Potassium 3.3 (L) 3.5 - 5.2 mmol/L    Chloride 96 (L) 98 - 107 mmol/L    CO2 32.0 (H) 22.0 - 29.0 mmol/L    Calcium 9.6 8.6 - 10.5 mg/dL    Total Protein 7.3 6.0 - 8.5 g/dL    Albumin 4.3 3.5 - 5.2 g/dL    ALT (SGPT) 19 1 - 33 U/L    AST (SGOT) 17 1 - 32 U/L    Alkaline Phosphatase 86 39 - 117 U/L    Total Bilirubin 0.6 0.0 - 1.2 mg/dL    Globulin 3.0 gm/dL     A/G Ratio 1.4 g/dL    BUN/Creatinine Ratio 12.8 7.0 - 25.0    Anion Gap 11.0 5.0 - 15.0 mmol/L    eGFR 81.9 >60.0 mL/min/1.73   Lipase    Collection Time: 08/23/23 12:46 PM    Specimen: Blood   Result Value Ref Range    Lipase 19 13 - 60 U/L   BNP    Collection Time: 08/23/23 12:46 PM    Specimen: Blood   Result Value Ref Range    proBNP <36.0 0.0 - 900.0 pg/mL   Green Top (Gel)    Collection Time: 08/23/23 12:46 PM   Result Value Ref Range    Extra Tube Hold for add-ons.    Lavender Top    Collection Time: 08/23/23 12:46 PM   Result Value Ref Range    Extra Tube hold for add-on    Gold Top - SST    Collection Time: 08/23/23 12:46 PM   Result Value Ref Range    Extra Tube Hold for add-ons.    Gray Top    Collection Time: 08/23/23 12:46 PM   Result Value Ref Range    Extra Tube Hold for add-ons.    Light Blue Top    Collection Time: 08/23/23 12:46 PM   Result Value Ref Range    Extra Tube Hold for add-ons.    CBC Auto Differential    Collection Time: 08/23/23 12:46 PM    Specimen: Blood   Result Value Ref Range    WBC 8.35 3.40 - 10.80 10*3/mm3    RBC 5.20 3.77 - 5.28 10*6/mm3    Hemoglobin 15.7 12.0 - 15.9 g/dL    Hematocrit 46.6 34.0 - 46.6 %    MCV 89.6 79.0 - 97.0 fL    MCH 30.2 26.6 - 33.0 pg    MCHC 33.7 31.5 - 35.7 g/dL    RDW 12.3 12.3 - 15.4 %    RDW-SD 40.3 37.0 - 54.0 fl    MPV 9.7 6.0 - 12.0 fL    Platelets 254 140 - 450 10*3/mm3    Neutrophil % 57.8 42.7 - 76.0 %    Lymphocyte % 31.5 19.6 - 45.3 %    Monocyte % 6.9 5.0 - 12.0 %    Eosinophil % 2.6 0.3 - 6.2 %    Basophil % 0.8 0.0 - 1.5 %    Immature Grans % 0.4 0.0 - 0.5 %    Neutrophils, Absolute 4.82 1.70 - 7.00 10*3/mm3    Lymphocytes, Absolute 2.63 0.70 - 3.10 10*3/mm3    Monocytes, Absolute 0.58 0.10 - 0.90 10*3/mm3    Eosinophils, Absolute 0.22 0.00 - 0.40 10*3/mm3    Basophils, Absolute 0.07 0.00 - 0.20 10*3/mm3    Immature Grans, Absolute 0.03 0.00 - 0.05 10*3/mm3    nRBC 0.0 0.0 - 0.2 /100 WBC   D-dimer, Quantitative    Collection Time:  08/23/23 12:46 PM    Specimen: Blood   Result Value Ref Range    D-Dimer, Quantitative 0.43 0.00 - 0.51 MCGFEU/mL   ECG 12 Lead ED Triage Standing Order; Chest Pain    Collection Time: 08/23/23  3:11 PM   Result Value Ref Range    QT Interval 436 ms    QTC Interval 431 ms   High Sensitivity Troponin T 2Hr    Collection Time: 08/23/23  3:18 PM    Specimen: Blood   Result Value Ref Range    HS Troponin T 8 <10 ng/L    Troponin T Delta 0 >=-4 - <+4 ng/L       If labs were ordered, I independently reviewed the results and considered them in treating the patient.        RADIOLOGY  XR Chest 1 View    Result Date: 8/23/2023  XR CHEST 1 VW Date of Exam: 8/23/2023 11:11 AM CDT Indication: Chest Pain Triage Protocol Comparison: None available. Findings: Cardiomediastinal silhouette is unremarkable. There is a calcified left upper lobe granuloma. No airspace disease, pneumothorax, nor pleural effusion.No acute osseous abnormality identified.     Impression: No acute process identified Electronically Signed: Roby Sharma MD  8/23/2023 11:26 AM CDT  Workstation ID: [id number]     I ordered and independently reviewed the above noted radiographic studies.      I viewed images of chest x-ray which showed no active disease per my independent interpretation.    See radiologist's dictation for official interpretation.        PROCEDURES    Procedures    ECG 12 Lead ED Triage Standing Order; Chest Pain   Final Result   Test Reason : ED Triage Standing Order~   Blood Pressure :   */*   mmHG   Vent. Rate :  59 BPM     Atrial Rate :  59 BPM      P-R Int : 150 ms          QRS Dur : 102 ms       QT Int : 436 ms       P-R-T Axes :  28  22  60 degrees      QTc Int : 431 ms      Sinus bradycardia   Otherwise normal ECG   When compared with ECG of 23-AUG-2023 11:45,   No significant change was found   Confirmed by SHARMILA CRUZ MD ([id number]) on 8/23/2023 4:56:04 PM      Referred By: EDMD           Confirmed By: SHARMILA CRUZ MD      ECG 12 Lead ED  Triage Standing Order; Chest Pain   Final Result   Test Reason : ED Triage Standing Order~   Blood Pressure :   */*   mmHG   Vent. Rate :  69 BPM     Atrial Rate :  69 BPM      P-R Int : 154 ms          QRS Dur :  96 ms       QT Int : 378 ms       P-R-T Axes :  38  24  59 degrees      QTc Int : 405 ms      Normal sinus rhythm   Normal ECG   No previous ECGs available   Confirmed by SHARMILA CRUZ MD ([id number]) on 8/23/2023 1:17:45 PM      Referred By: EDMD           Confirmed By: SHARMILA CRUZ MD          MEDICATIONS GIVEN IN ER    Medications   sodium chloride 0.9 % flush 10 mL (has no administration in time range)   aluminum-magnesium hydroxide-simethicone (MAALOX MAX) 400-400-40 MG/5ML suspension 15 mL (has no administration in time range)   aspirin chewable tablet 324 mg (324 mg Oral Given 8/23/23 1246)   famotidine (PEPCID) injection 20 mg (20 mg Intravenous Given 8/23/23 1527)         MEDICAL DECISION MAKING, PROGRESS, and CONSULTS    All labs have been independently reviewed by me.  All radiology studies have been reviewed by me and the radiologist dictating the report.  All EKG's have been independently viewed and interpreted by me/my attending physician.      Discussion below represents my analysis of pertinent findings related to patient's condition, differential diagnosis, treatment plan and final disposition.      Differential diagnosis:    Acute coronary syndrome versus reflux esophagitis versus chest wall strain versus anxiety, etc.      Additional sources:      - External (non-ED) record review: I queried the system for relevant medical records both internally and externally.  I note that on 7/25/2023 the patient was evaluated for knee pain at [facility].  I am unable to retrieve last cardiac stress test and CT calcium score of the heart.    - Chronic or social conditions impacting care: Obesity with BMI 37.    - Shared decision making: Patient in full agreement with current plan for evaluation,  treatment, outpatient follow-up and returning if worse.  I will send her to the heart valve clinic for recheck.      Orders placed during this visit:  Orders Placed This Encounter   Procedures    XR Chest 1 View    [place] Draw    High Sensitivity Troponin T    Comprehensive Metabolic Panel    Lipase    BNP    CBC Auto Differential    D-dimer, Quantitative    High Sensitivity Troponin T 2Hr    Ambulatory Ref[place] to [facility] [place] [facility] [place]    NPO Diet NPO Type: Strict NPO    Undress & Gown    Continuous Pulse Oximetry    Oxygen Therapy- Nasal Cannula; Titrate 1-6 LPM Per SpO2; 90 - 95%    ECG 12 Lead ED Triage Standing Order; Chest Pain    ECG 12 Lead ED Triage Standing Order; Chest Pain    Insert Peripheral IV    CBC & Differential    Green Top (Gel)    Lavender Top    Gold Top - SST    Gray Top    Light Blue Top         Additional orders considered but not ordered:  CTA chest.  D-dimer was within normal limits and therefore this was not necessary.    ED Course:    Consultants:      ED Course as of 08/23/23 1748   Wed Aug 23, 2023   1705 I have contacted Dr. Prasad on-call cardiology and asked that he return my call. [MS]   1706 HEART Pathway for Early Discharge in Acute Chest Pain - MDCalc  Calculated on Aug 23 2023 5:06 PM  4 points -> HEART Pathway Score  High risk -> 12-65% 30-day MACE Admit to hospital or observation. Further testing indicated. [MS]   1717 I spoke with Dr. Prasad.  He recommends outpatient follow-up in the heart valve clinic.  I will send the patient home with a prescription of nitroglycerin.  She understands the need to return immediately if her symptoms worsen. [MS]      ED Course User Index  [MS] Titus Rosario MD              Shared Decision Making:  After my consideration of clinical presentation and any laboratory/radiology studies obtained, I discussed the findings with the patient/patient representative who is in agreement with the treatment plan and the final  disposition.   Risks and benefits of discharge and/or observation/admission were discussed.       AS OF 17:48 EDT VITALS:    BP - 128/75  HR - 57  TEMP - 97.8 øF (36.6 øC) (Oral)  O2 SATS - 98%                  DIAGNOSIS  Final diagnoses:   Nonspecific chest pain   Hypokalemia   Multiple risk factors for coronary artery disease         DISPOSITION  DISCHARGE    Patient discharged in stable condition.    Reviewed implications of results, diagnosis, meds, responsibility to follow up, warning signs and symptoms of possible worsening, potential complications and reasons to return to ER.    Patient/Family voiced understanding of above instructions.    Discussed plan for discharge, as there is no emergent indication for admission.  Pt/family is agreeable and understands need for follow up and possible repeat testing.  Pt/family is aware that discharge does not mean that nothing is wrong but that it indicates no emergency is currently present that requires admission and they must continue care with follow-up as given below or with a physician of their choice.     FOLLOW-UP  Xi Zaidi, APRN  [address] [place] [id number] [phone]      NEXT AVAILABLE APPOINTMENT - RECHECK OF CONDITION    [facility] EMERGENCY DEPARTMENT  [address] [facility] [facility] [address] [phone]    IF YOU HAVE ANY CONCERN OF WORSENING CONDITION    [facility] CARDIOLOGY  [address] [address] [phone]             Medication List        New Prescriptions      nitroglycerin 0.4 MG SL tablet  Commonly known as: NITROSTAT  Place 1 tablet under the tongue Every 5 (Five) Minutes As Needed for Chest Pain. Take no more than 3 doses in 15 minutes.               Where to Get Your Medications        These medications were sent to [facility] - [place], [place] - [address] - [phone]  - [phone]  [address]  [place] [address]      Phone: [phone]   nitroglycerin 0.4 MG SL tablet             Please note that portions of this document were completed with voice recognition software.        Titus Rosario MD  08/23/23 1093

## 2024-04-21 NOTE — ED PROVIDER NOTES
Encounter Date: 4/21/2024       History     Chief Complaint   Patient presents with    Chest Pain     Pt arrives via [facility], EMS transfer from [place], for cardiology, pericarditis      24 M transferred from [place] for cardiology evaluation.  Patient was no past medical history denies any drug allergies.  Family history of CAD at the age of 50 in his grandfather on the maternal side.  No other family history of early CAD.  Patient states Friday night 04/19/2024 he felt some chest pain described as pressure heaviness and felt short of breath states it resolved he went to sleep did not think much of it.  States the next day he went to work felt fine all day until last night before going to bed he felt severe heaviness in the center of his chest and felt short of breath again.  He states it felt better whenever he would lean forward.  Patient went to the emergency department in [place] had an EKG concerning for pericarditis with diffuse ST segment elevation and GA depression no reciprocal ST changes.  Patient's EKG and history concerning for pericarditis but had elevated troponin at 11 which brought consideration possible myocarditis as well.  Patient was given aspirin NSAIDs and colchicine held pending Cardiology recommendations and he was transferred here for further workup including cardiology admission.  Currently patient was chest pain-free no shortness of breath and no symptoms.        Review of patient's allergies indicates:  No Known Allergies  Past Medical History:   Diagnosis Date    Torn ACL      Past Surgical History:   Procedure Laterality Date    KNEE ARTHROSCOPY W/ ACL RECONSTRUCTION Left 10/27/2022    Procedure: RECONSTRUCTION, KNEE, ACL, ARTHROSCOPIC;  Surgeon: Yariel Judge Jr., MD;  Location: [facility];  Service: Orthopedics;  Laterality: Left;  PATELLA TENDON     Family History   Problem Relation Name Age of Onset    No Known Problems Mother      No Known Problems Father       Social History     Tobacco  Use    Smoking status: Never    Smokeless tobacco: Never   Substance Use Topics    Alcohol use: Yes     Alcohol/week: 1.0 standard drink of alcohol     Types: 1 Cans of beer per week     Comment: occasional    Drug use: Never     Review of Systems   Constitutional:  Negative for chills and fever.   Respiratory:  Positive for shortness of breath. Negative for cough.    Cardiovascular:  Positive for chest pain.   Gastrointestinal:  Negative for abdominal pain, nausea and vomiting.   Musculoskeletal:  Negative for myalgias.   All other systems reviewed and are negative.      Physical Exam     Initial Vitals [04/21/24 0151]   BP Pulse Resp Temp SpO2   112/83 67 20 98.1 °F (36.7 °C) 96 %      MAP       --         Physical Exam    Nursing note and vitals reviewed.  Constitutional: He appears well-developed and well-nourished. No distress.   HENT:   Head: Normocephalic and atraumatic.   Eyes: Conjunctivae are normal.   Cardiovascular:  Normal rate and intact distal pulses.           Pulmonary/Chest: No respiratory distress. He has no wheezes. He has no rhonchi.   Abdominal: Abdomen is soft. There is no abdominal tenderness. There is no rebound and no guarding.   Musculoskeletal:         General: Normal range of motion.     Neurological: He is alert and oriented to person, place, and time. He has normal strength.   Skin: Skin is warm and dry.   Psychiatric: He has a normal mood and affect.         ED Course   Procedures  Labs Reviewed   TROPONIN I          Imaging Results    None          Medications - No data to display  Medical Decision Making  Differential diagnosis includes pericarditis, myocarditis, acute MI, do not believe he has a pulmonary embolus he was no hypoxia no tachycardia no signs of right heart strain.  Patient was chest pain-free currently hemodynamically stable no distress lungs are clear without any fluid strong peripheral pulses.  I discussed case with Cardiology they will admit to their service follow  up repeat troponins and echocardiogram proceed with further treatment after more testing    Problems Addressed:  Acute pericarditis: acute illness or injury  Troponin level elevated: acute illness or injury that poses a threat to life or bodily functions    Amount and/or Complexity of Data Reviewed  ECG/medicine tests: independent interpretation performed. Decision-making details documented in ED Course.    Risk  Decision regarding hospitalization.    Critical Care  Total time providing critical care: 20 minutes               ED Course as of 04/21/24 0249   Sun Apr 21, 2024   0220 EKG at 0158.  70 beats per minute sinus rhythm diffuse OK suppression with diffuse ST elevation consistent with pericarditis.  No ST segment depressions.  EKG without significant change compared to 04/20/2024 at 2243 [LF]   0231 Paged [facility] [MB]   0239 Discussed case with Kelly on-call for [facility].  Differential diagnosis is pericarditis versus myocarditis.  No active infection identified at this point.  He was given aspirin at the previous facility.  He was chest pain-free currently.  Echocardiogram has been ordered she agrees with that plan hold NSAIDs and further anticoagulation for now.  [facility] will admit follow up echo trend enzymes [LF]      ED Course User Index  [LF] Reginaldo Velazquez MD  [MB] Margarita Horan                           Clinical Impression:  Final diagnoses:  [I30.9] Acute pericarditis  [R79.89] Troponin level elevated (Primary)          ED Disposition Condition    Admit Stable                Reginaldo Velazquez MD  04/21/24 0249

## 2024-04-21 NOTE — ED PROVIDER NOTES
Encounter Date: 4/20/2024       History     Chief Complaint   Patient presents with    Chest Pain     C/o constant midsternal CP since last night going on all day today and worsening tonight. States nothing makes it worse or better. Pain is pressure type pain 6/10. AAOx4. Denies SOB and NVD.      24-year-old male with no reported past history presents with central chest pain.  Symptoms started approximately 24 hours ago and worsened tonight. Sharp constant pain located the center of his chest and does not radiate.  Patient tried to go asleep tonight however after 15 minutes can not sleep due to the pain severity. No alleviating or aggravating factors.  No other associated symptoms including shortness of breath nausea or vomiting.  Patient patient states he went to sleep last night        Review of patient's allergies indicates:  No Known Allergies  Past Medical History:   Diagnosis Date    Torn ACL      Past Surgical History:   Procedure Laterality Date    KNEE ARTHROSCOPY W/ ACL RECONSTRUCTION Left 10/27/2022    Procedure: RECONSTRUCTION, KNEE, ACL, ARTHROSCOPIC;  Surgeon: Yariel Judge Jr., MD;  Location: [facility];  Service: Orthopedics;  Laterality: Left;  PATELLA TENDON     Family History   Problem Relation Name Age of Onset    No Known Problems Mother      No Known Problems Father       Social History     Tobacco Use    Smoking status: Never    Smokeless tobacco: Never   Substance Use Topics    Alcohol use: Yes     Alcohol/week: 1.0 standard drink of alcohol     Types: 1 Cans of beer per week     Comment: occasional    Drug use: Never     Review of Systems   Constitutional:  Negative for chills, fatigue and fever.   Respiratory:  Negative for shortness of breath.    Cardiovascular:  Positive for chest pain. Negative for palpitations and leg swelling.   Genitourinary:  Negative for dysuria.   Musculoskeletal:  Negative for back pain and myalgias.   Skin:  Negative for rash.   Neurological:  Negative for tremors  and weakness.       Physical Exam     Initial Vitals [04/20/24 2238]   BP Pulse Resp Temp SpO2   (!) 140/92 78 16 97.7 °F (36.5 °C) 100 %      MAP       --         Physical Exam    Nursing note and vitals reviewed.  Constitutional: He appears well-developed and well-nourished. He appears ill.   HENT:   Head: Normocephalic and atraumatic.   Eyes: Conjunctivae and EOM are normal. Pupils are equal, round, and reactive to light.   Neck: Neck supple.   Normal range of motion.  Cardiovascular:  Normal rate, regular rhythm and normal heart sounds.     Exam reveals no gallop and no friction rub.       No murmur heard.  Pulmonary/Chest: Breath sounds normal. He exhibits no tenderness.   Abdominal: Abdomen is soft. There is no abdominal tenderness.   Musculoskeletal:         General: Normal range of motion.      Cervical back: Normal range of motion and neck supple.     Neurological: He is alert.   Skin: Skin is warm and dry.   Psychiatric: He has a normal mood and affect.         ED Course   Critical Care    Date/Time: 4/21/2024 12:00 AM    Performed by: Fox Rodriguez MD  Authorized by: Fox Rodriguez MD  Direct patient critical care time: 10 minutes  Additional history critical care time: 5 minutes  Ordering / reviewing critical care time: 10 minutes  Documentation critical care time: 10 minutes  Consulting other physicians critical care time: 5 minutes  Total critical care time (exclusive of procedural time) : 40 minutes  Critical care time was exclusive of separately billable procedures and treating other patients and teaching time.  Critical care was necessary to treat or prevent imminent or life-threatening deterioration of the following conditions: cardiac failure.  Critical care was time spent personally by me on the following activities: examination of patient, obtaining history from patient or surrogate, ordering and performing treatments and interventions, ordering and review of laboratory studies, pulse  oximetry, re-evaluation of patient's condition, review of old charts, development of treatment plan with patient or surrogate and ordering and review of radiographic studies.        Labs Reviewed   COMPREHENSIVE METABOLIC PANEL - Abnormal; Notable for the following components:       Result Value    Glucose Level 106 (*)     Aspartate Aminotransferase 101 (*)     All other components within normal limits   CBC WITH DIFFERENTIAL - Abnormal; Notable for the following components:    RBC 4.55 (*)     MCV 96.9 (*)     MCH 33.2 (*)     IG# 0.05 (*)     All other components within normal limits   TROPONIN I - Abnormal; Notable for the following components:    Troponin-I 11.355 (*)     All other components within normal limits   SEDIMENTATION RATE, AUTOMATED - Normal   B-TYPE NATRIURETIC PEPTIDE - Normal   CBC W/ AUTO DIFFERENTIAL    Narrative:     The following orders were created for panel order CBC auto differential.  Procedure                               Abnormality         Status                     ---------                               -----------         ------                     CBC with Differential[[id number]]        Abnormal            Final result                 Please view results for these tests on the individual orders.   C-REACTIVE PROTEIN        ECG Results              EKG 12-lead (In process)        Collection Time Result Time QRS Duration O[facility] QTC Calculation    04/20/24 22:43:41 04/20/24 23:08:57 88 384                     In process by Interface, Lab In [facility] (04/20/24 23:09:02)                   Narrative:    Test Reason : R07.9,    Vent. Rate : 070 BPM     Atrial Rate : 070 BPM     P-R Int : 176 ms          QRS Dur : 088 ms      QT Int : 356 ms       P-R-T Axes : 046 069 032 degrees     QTc Int : 384 ms    Normal sinus rhythm with sinus arrhythmia  Acute pericarditis  Abnormal ECG  No previous ECGs available    Referred By: [email or web address]   SELF           Confirmed By:                       In  process by Interface, Lab In [facility] (04/20/24 23:08:32)                   Narrative:    Test Reason : R07.9,    Vent. Rate : 070 BPM     Atrial Rate : 070 BPM     P-R Int : 176 ms          QRS Dur : 088 ms      QT Int : 356 ms       P-R-T Axes : 046 069 032 degrees     QTc Int : 384 ms    Normal sinus rhythm with sinus arrhythmia  Acute pericarditis  Abnormal ECG  No previous ECGs available    Referred By: [email or web address]   SELF           Confirmed By:                                   Imaging Results              X-Ray Chest PA And Lateral (Final result)  Result time 04/20/24 23:52:00      Final result by Angel Bowens MD (04/20/24 23:52:00)                   Impression:      1. Nonspecific central interstitial opacities.  2. No dense lobar consolidation, large pleural effusion or pneumothorax.      Electronically signed by: Angel Bowens MD  Date:    04/20/2024  Time:    23:52               Narrative:    EXAMINATION:  XR CHEST PA AND LATERAL    CLINICAL HISTORY:  Chest pain, unspecified    FINDINGS:  There are nonspecific central interstitial opacities.  There is no dense lobar consolidation, large pleural effusion or pneumothorax.  The cardiomediastinal silhouette is normal in size and configuration.                                       Medications   aspirin tablet 325 mg (325 mg Oral Given 4/20/24 9637)     Medical Decision Making  24-year-old male presents with chest pain.  Was given aspirin on arrival.  EKG consistent with pericarditis, will defer anti-inflammatory/colchicine until troponin to evaluate pericarditis.  Patient found to have a troponin greater than presentation consistent with myopericarditis.  EKG not consistent with STEMI no reciprocal changes, classic EKG for pericarditis with global ST elevations with KY depressions and and KY elevations of aVR with + Spodick sign.  Cardiology service and echo/cardiac MRI unavailable at [place], we will transfer for to [facility] for higher level  of care.  Patient stable for transfer does not show signs of fluid overload and without dyspnea.    Problems Addressed:  Chest pain: complicated acute illness or injury  Myopericarditis: acute illness or injury that poses a threat to life or bodily functions    Amount and/or Complexity of Data Reviewed  Labs: ordered. Decision-making details documented in ED Course.  Radiology: ordered and independent interpretation performed. Decision-making details documented in ED Course.     Details: No evidence of cardiomegaly or infiltrate on my independent interpretation  ECG/medicine tests: ordered and independent interpretation performed.  Discussion of management or test interpretation with external provider(s): Case discussed for transfer with Dr. Garza.       Risk  OTC drugs.  Decision regarding hospitalization.                   ED Course as of 04/21/24 0051   Sat Apr 20, 2024   2247 EKG interpretation: Normal sinus rhythm, 71 BPM. Normal QRS axis. Diffuse ST elevation with AK depression in leads V4 through V6 and Lead 2 and AVF. AK elevation and depression. ST depression in V1. Positive Spodick sign. EKG consistent with pericarditis.   [DC]   2347 EKG interpretation: Normal sinus rhythm 70 BPM. Normal QRS axis. Diffuse ST elevation with AK depression in leads V4 through V6 and Lead 2 and AVF. AK elevation and depression. ST depression in V1. Positive Spodick sign. EKG consistent with pericarditis. [DC]   2355 X-Ray Chest PA And Lateral  1. Nonspecific central interstitial opacities.  2. No dense lobar consolidation, large pleural effusion or pneumothorax.   [DC]   2359 Troponin I(!): 11.355 [DC]   Sun Apr 21, 2024   0021 Pt accepted by Dr. Garza at [facility] for ED to ED transfer for higher level of care. Dr. Garza recommends additional cards telemedicine consult. Order for cards telemedicine consult placed. [DC]   0048 EMS has arrived for transfer prior to return to call this will delay patient care for  consult and send to [facility]. [DC]      ED Course User Index  [DC] Fox Rodriguez MD                             Clinical Impression:  Final diagnoses:  [R07.9] Chest pain  [I31.9] Myopericarditis (Primary)          ED Disposition Condition    Transfer to Another Facility Stable                Fox Rodriguez MD  04/21/24 0051

## 2024-04-21 NOTE — ED NOTES
Spoke with Deepika @ [facility] for consult. E-faxed pt chart. She will let me know if she received them. All questions answered. Will have on-call physician call via teledoc.

## 2024-04-21 NOTE — H&P
"[facility]    Cardiology  History and Physical     Patient Name: Migue Mistry  MRN: [id number]  Admission Date: 4/21/2024  Code Status: Full Code   Attending Provider: Mckay Guillen MD   Primary Care Physician: Shoaib Rodriguez PA  Principal Problem:<principal problem not specified>    Patient information was obtained from patient, caregiver / friend, and ER records.     Subjective:     Chief Complaint: CP     HPI: Mr. Mistry is a 25 y/o male who in unknown to [facility]. The patient presented to the ER on 4.20.24 with c/o CP. The patient presented to [facility] on 4.21.24 with c/o CP that started on 4.19.24. He reported that he developed CP that was located to his Anterior Chest and described as a "heaviness" and rated 10/10 on a verbal scale. He reported associated symptoms of SOB. He reported that the CP would improve with leaning forward. He initially presented to [facility] ER with these complaints and he had a Troponin Level which was > 11.0 and he was started on ASA, NSAIDs and Colchicine and transferred to [facility] for Cardiology Evaluation. His Troponin increased to 25.339. He was ultimately admitted to Cardiology for further workup and management. It is important to note that the PT did have a Viral Illness about 2 weeks Prior which has since resolved.     PMH: Torn ACL  PSH: Knee Arthroplasty with ACL Reconstruction  Family History: Family History of CAD at age 50  Social History: [place] Illicit Drug, ETOH and Tobacco Use    Previous Cardiac Diagnostics: None    Review of patient's allergies indicates:  No Known Allergies    Current Facility-Administered Medications   Medication Dose Route Frequency Provider Last Rate Last Admin    0.9%  NaCl infusion   Intravenous Continuous Reginaldo Velazquez  mL/hr at 04/21/24 0334 New Bag at 04/21/24 0334    acetaminophen tablet 650 mg  650 mg Oral Q8H PRN Reginaldo Velazquez MD        famotidine (PF) injection 20 mg  20 mg Intravenous Q12H Marcus, " Reginaldo PINZON MD   20 mg at 04/21/24 0918    melatonin tablet 6 mg  6 mg Oral Nightly PRN Reginaldo Velazquez MD        morphine injection 2 mg  2 mg Intravenous Q4H PRN Reginaldo Velazquez MD        morphine injection 4 mg  4 mg Intravenous Q4H PRN Reginaldo Velazquez MD        ondansetron injection 4 mg  4 mg Intravenous Q8H PRN Reginaldo Velazquez MD        prochlorperazine injection Soln 5 mg  5 mg Intravenous Q6H PRN Reginaldo Velazquez MD         Review of Systems   Constitutional: Positive for malaise/fatigue. Negative for fever.   Cardiovascular:  Positive for chest pain and dyspnea on exertion. Negative for leg swelling, orthopnea and palpitations.   Respiratory:  Positive for shortness of breath.    All other systems reviewed and are negative.    Objective:     Vital Signs (Most Recent):  Temp: 98 °F (36.7 °C) (04/21/24 0718)  Pulse: 71 (04/21/24 0718)  Resp: 18 (04/21/24 0718)  BP: 105/71 (04/21/24 0718)  SpO2: 98 % (04/21/24 0718) Vital Signs (24h Range):  Temp:  [97.7 °F (36.5 °C)-98.2 °F (36.8 °C)] 98 °F (36.7 °C)  Pulse:  [60-78] 71  Resp:  [14-20] 18  SpO2:  [96 %-100 %] 98 %  BP: (105-153)/(69-92) 105/71     Weight: 86.6 kg (191 lb)  Body mass index is 28.21 kg/m².    SpO2: 98 %       No intake or output data in the 24 hours ending 04/21/24 0939    Lines/Drains/Airways       Peripheral Intravenous Line  Duration                  Peripheral IV - Single Lumen 04/21/24 0000 18 G Anterior;Right Forearm <1 day                  Physical Exam  Constitutional:       General: He is not in acute distress.     Appearance: Normal appearance.   HENT:      Head: Normocephalic.      Mouth/Throat:      Mouth: Mucous membranes are moist.   Eyes:      Extraocular Movements: Extraocular movements intact.      Conjunctiva/sclera: Conjunctivae normal.   Cardiovascular:      Rate and Rhythm: Normal rate and regular rhythm.      Heart sounds: Normal heart sounds. No murmur heard.  Pulmonary:      Effort: Pulmonary effort is normal.  "No respiratory distress.      Breath sounds: Normal breath sounds.   Abdominal:      Palpations: Abdomen is soft.   Musculoskeletal:         General: Normal range of motion.   Skin:     General: Skin is warm and dry.      Capillary Refill: Capillary refill takes less than 2 seconds.   Neurological:      General: No focal deficit present.      Mental Status: He is alert and oriented to person, place, and time.   Psychiatric:         Mood and Affect: Mood normal.       EKG:       Telemetry: SR    Significant Labs: BMP:   Recent Labs   Lab 04/20/24 2312      K 4.8   CO2 27   BUN 9.0   CREATININE 1.02   CALCIUM 9.2   , CMP   Recent Labs   Lab 04/20/24 2312      K 4.8   CO2 27   BUN 9.0   CREATININE 1.02   CALCIUM 9.2   ALBUMIN 3.9   BILITOT 0.5   ALKPHOS 83   *   ALT 37   , CBC   Recent Labs   Lab 04/20/24 2312   WBC 6.42   HGB 15.1   HCT 44.1      , INR No results for input(s): "INR", "PROTIME" in the last 48 hours., Lipid Panel No results for input(s): "CHOL", "HDL", "LDLCALC", "TRIG", "CHOLHDL" in the last 48 hours., Troponin   Recent Labs   Lab 04/20/24  2312 04/21/24  0224 04/21/24  0834   TROPONINI 11.355* 25.339* 22.443*   , All pertinent lab results from the last 24 hours have been reviewed., and   Recent Lab Results  (Last 5 results in the past 24 hours)        04/21/24  1057   04/21/24  0834   04/21/24  0753   04/21/24  0257   04/21/24  0224        Phencyclidine Negative               Amphetamines, Urine Negative               Ao peak hiram     1.14           Ao VTI     24.20           Appearance, UA Clear               AV valve area     2.96           DAYDAY by Velocity Ratio     3.13           AV mean gradient     3           AV index (prosthetic)     0.86           AV peak gradient     5           AV Velocity Ratio     0.90           Bacteria, UA None Seen               Barbituates, Urine Negative               Benzodiazepine, Urine Negative               Bilirubin, UA Negative       "         BSA     2.05           Cannabinoids, Urine Negative               Cocaine, Urine Negative               Color, UA Light-Yellow               Left Ventricle Relative Wall Thickness     0.41           E/A ratio     1.56           E/E' ratio     7.36           E wave deceleration time     156.00           Fentanyl, Urine Negative               FS     27           Glucose, UA Normal               IVSd     0.93           Ketones, UA Negative               LA size     3.80           LA volume     54.80           LA Volume Index (Mod)     27.0           LVOT area     3.5           Leukocyte Esterase, UA Negative               LV LATERAL E/E' RATIO     6.57           LV SEPTAL E/E' RATIO     8.36           LV EDV BP     113.00           LV Diastolic Volume Index     55.67           LVIDd     4.91           LVIDs     3.57           LV mass     168.37           LV Mass Index     83           Left Ventricular Outflow Tract Mean Gradient     2.00           Left Ventricular Outflow Tract Mean Velocity     0.68           LVOT diameter     2.10           LVOT peak rogerio     1.03           LVOT stroke volume     71.66           LVOT peak VTI     20.70           LV ESV BP     53.30           LV Systolic Volume Index     26.3           MDMA, Urine Negative               Mean e'     0.13           MV valve area by continuity eq     2.64           MV mean gradient     2           MV peak gradient     4           MV Peak A Rogerio     0.59           MV Peak E Rogerio     0.92           MV VTI     27.1           NITRITE UA Negative               Blood, UA Negative               Franco's Biplane MOD Ejection Fraction     51           QRS Duration       98         OHS QTC Calculation       390         Opiates, Urine Negative               pH, UA 6.0               pH, Urine 6.0               Protein, UA Negative               Posterior Wall     1.00           RA Major Axis     5.03           Est. RA pres     3           RA Width     3.35            RBC, UA None Seen               RV/LV Ratio     0.64           RVDD     3.14           Specific Gravity,UA 1.012               Specific Gravity, Urine Auto 1.012               Squamous Epithelial Cells, UA None Seen               TAPSE     2.15           TDI SEPTAL     0.11           TDI LATERAL     0.14           Troponin I   22.443       25.339  Comment: Troponin failed Delta Check, make courtesy call to provider       Urobilinogen, UA Normal               WBC, UA None Seen               ZLVIDD     -2.03           ZLVIDS     -0.26                                Assessment and Plan:   NSTEMI - Unclear Type - Likely due to Pericarditis/Type II     - Peaked Troponin 25.339  Acute Pericarditis in the Setting of Recent Viral Illness    - Serial EKGs with Diffuse ST Elevations  Hx of Torn ACL s/p Repair  No Hx of GIB     PLAN:  ECHO Pending  Schedule/Consent for [facility] with Possible PTCA +/- Stenting 2/2 NSTEMI   Risk, Benefits and Alternatives Reviewed and Discussed with the PT and their Family and they wish to proceed with above Procedure.   Treatment Plan dependent on C Findings  UDS, UA, Flu and COVID-19 Screening Now  Labs and EKG in AM: CBC, CMP and Mg    Luis Jarvis, ANP  Cardiology  [facility]  04/21/2024

## 2024-04-22 VITALS
OXYGEN SATURATION: 98 % | SYSTOLIC BLOOD PRESSURE: 100 MMHG | WEIGHT: 191 LBS | HEART RATE: 67 BPM | DIASTOLIC BLOOD PRESSURE: 66 MMHG | BODY MASS INDEX: 28.29 KG/M2 | HEIGHT: 69 IN | RESPIRATION RATE: 18 BRPM | TEMPERATURE: 98 F

## 2024-04-22 PROBLEM — I30.9 ACUTE PERICARDITIS: Status: ACTIVE | Noted: 2024-04-22

## 2024-04-22 LAB
ALBUMIN SERPL-MCNC: 3.3 G/DL (ref 3.5–5)
ALBUMIN/GLOB SERPL: 1.4 RATIO (ref 1.1–2)
ALP SERPL-CCNC: 83 UNIT/L (ref 40–150)
ALT SERPL-CCNC: 37 UNIT/L (ref 0–55)
AST SERPL-CCNC: 75 UNIT/L (ref 5–34)
BASOPHILS # BLD AUTO: 0.03 X10(3)/MCL
BASOPHILS NFR BLD AUTO: 0.5 %
BILIRUB SERPL-MCNC: 0.4 MG/DL
BUN SERPL-MCNC: 12.7 MG/DL (ref 8.9–20.6)
CALCIUM SERPL-MCNC: 8.8 MG/DL (ref 8.4–10.2)
CHLORIDE SERPL-SCNC: 109 MMOL/L (ref 98–107)
CO2 SERPL-SCNC: 24 MMOL/L (ref 22–29)
CREAT SERPL-MCNC: 1.02 MG/DL (ref 0.73–1.18)
EOSINOPHIL # BLD AUTO: 0.14 X10(3)/MCL (ref 0–0.9)
EOSINOPHIL NFR BLD AUTO: 2.3 %
ERYTHROCYTE [DISTWIDTH] IN BLOOD BY AUTOMATED COUNT: 12.3 % (ref 11.5–17)
GFR SERPLBLD CREATININE-BSD FMLA CKD-EPI: >60 MLS/MIN/1.73/M2
GLOBULIN SER-MCNC: 2.4 GM/DL (ref 2.4–3.5)
GLUCOSE SERPL-MCNC: 92 MG/DL (ref 74–100)
HCT VFR BLD AUTO: 44.1 % (ref 42–52)
HGB BLD-MCNC: 15 G/DL (ref 14–18)
IMM GRANULOCYTES # BLD AUTO: 0.02 X10(3)/MCL (ref 0–0.04)
IMM GRANULOCYTES NFR BLD AUTO: 0.3 %
LYMPHOCYTES # BLD AUTO: 1.96 X10(3)/MCL (ref 0.6–4.6)
LYMPHOCYTES NFR BLD AUTO: 31.9 %
MAGNESIUM SERPL-MCNC: 2.1 MG/DL (ref 1.6–2.6)
MCH RBC QN AUTO: 33.2 PG (ref 27–31)
MCHC RBC AUTO-ENTMCNC: 34 G/DL (ref 33–36)
MCV RBC AUTO: 97.6 FL (ref 80–94)
MONOCYTES # BLD AUTO: 0.66 X10(3)/MCL (ref 0.1–1.3)
MONOCYTES NFR BLD AUTO: 10.7 %
NEUTROPHILS # BLD AUTO: 3.34 X10(3)/MCL (ref 2.1–9.2)
NEUTROPHILS NFR BLD AUTO: 54.3 %
NRBC BLD AUTO-RTO: 0 %
OHS QRS DURATION: 86 MS
OHS QTC CALCULATION: 393 MS
PLATELET # BLD AUTO: 188 X10(3)/MCL (ref 130–400)
PMV BLD AUTO: 9.7 FL (ref 7.4–10.4)
POTASSIUM SERPL-SCNC: 5.1 MMOL/L (ref 3.5–5.1)
PROT SERPL-MCNC: 5.7 GM/DL (ref 6.4–8.3)
RBC # BLD AUTO: 4.52 X10(6)/MCL (ref 4.7–6.1)
SODIUM SERPL-SCNC: 140 MMOL/L (ref 136–145)
WBC # SPEC AUTO: 6.15 X10(3)/MCL (ref 4.5–11.5)

## 2024-04-22 PROCEDURE — 85025 COMPLETE CBC W/AUTO DIFF WBC: CPT | Performed by: NURSE PRACTITIONER

## 2024-04-22 PROCEDURE — 93010 ELECTROCARDIOGRAM REPORT: CPT | Mod: ,,, | Performed by: INTERNAL MEDICINE

## 2024-04-22 PROCEDURE — 83735 ASSAY OF MAGNESIUM: CPT | Performed by: NURSE PRACTITIONER

## 2024-04-22 PROCEDURE — 93005 ELECTROCARDIOGRAM TRACING: CPT

## 2024-04-22 PROCEDURE — 25000003 PHARM REV CODE 250: Performed by: INTERNAL MEDICINE

## 2024-04-22 PROCEDURE — 80053 COMPREHEN METABOLIC PANEL: CPT | Performed by: NURSE PRACTITIONER

## 2024-04-22 RX ORDER — COLCHICINE 0.6 MG/1
0.6 TABLET ORAL 2 TIMES DAILY
Qty: 60 TABLET | Refills: 5 | Status: SHIPPED | OUTPATIENT
Start: 2024-04-22 | End: 2025-10-22

## 2024-04-22 RX ORDER — IBUPROFEN 600 MG/1
600 TABLET ORAL 3 TIMES DAILY
Qty: 42 TABLET | Refills: 0 | Status: SHIPPED | OUTPATIENT
Start: 2024-04-22 | End: 2024-05-06

## 2024-04-22 RX ORDER — PANTOPRAZOLE SODIUM 40 MG/1
40 TABLET, DELAYED RELEASE ORAL DAILY
Qty: 60 TABLET | Refills: 5 | Status: SHIPPED | OUTPATIENT
Start: 2024-04-22 | End: 2025-04-22

## 2024-04-22 RX ADMIN — COLCHICINE 0.6 MG: 0.6 CAPSULE ORAL at 08:04

## 2024-04-22 RX ADMIN — PANTOPRAZOLE SODIUM 40 MG: 40 TABLET, DELAYED RELEASE ORAL at 08:04

## 2024-04-22 RX ADMIN — IBUPROFEN 600 MG: 600 TABLET, FILM COATED ORAL at 08:04

## 2024-04-22 NOTE — DISCHARGE SUMMARY
"[address]    Cardiology  Discharge Summary      Patient Name: Migue Mistry  MRN: [id number]  Admission Date: 4/21/2024  Hospital Length of Stay: 1 days  Discharge Date and Time:  04/22/2024 9:40 AM  Attending Physician: Mckay Guillen MD  Discharging Provider: VICKI Harry  Primary Care Physician: Shoaib Rodriguez PA    HPI/Hospital Course:    Mr. Mistry is a 25 y/o male who in unknown to [facility]. The patient presented to the ER on 4.20.24 with c/o CP. The patient presented to [facility] on 4.21.24 with c/o CP that started on 4.19.24. He reported that he developed CP that was located to his Anterior Chest and described as a "heaviness" and rated 10/10 on a verbal scale. He reported associated symptoms of SOB. He reported that the CP would improve with leaning forward. He initially presented to [facility] ER with these complaints and he had a Troponin Level which was > 11.0 and he was started on ASA, NSAIDs and Colchicine and transferred to [facility] for Cardiology Evaluation. His Troponin increased to 25.339. He was ultimately admitted to Cardiology for further workup and management. It is important to note that the PT did have a Viral Illness about 2 weeks Prior which has since resolved.      PMH: Torn ACL  PSH: Knee Arthroplasty with ACL Reconstruction  Family History: Family History of CAD at age 50  Social History: Olga Illicit Drug, ETOH and Tobacco Use    Previous Cardiac Diagnostics:  LHC (4.21.24):  No obstructive coronary artery disease with less than 10% stenosis in all major epicardial vessels. Right dominant system. Borderline normal LVEF of 50-55%    ECHO (4.21.24):  Left Ventricle: The left ventricle is normal in size. Normal wall thickness. Mild global hypokinesis present. There is mildly reduced systolic function with a visually estimated ejection fraction of 45 - 50%. Right Ventricle: Normal right ventricular cavity size. Systolic function is normal. " Mitral Valve: There is mild regurgitation.  IVC/SVC: Normal venous pressure at 3 mmHg.    Procedure(s) (LRB):  ANGIOGRAM, CORONARY ARTERY (N/A)   Consults:   There are no hospital problems to display for this patient.    Discharged Condition: stable  Review of Systems   Cardiovascular:  Negative for chest pain, dyspnea on exertion, leg swelling and palpitations.   Respiratory:  Negative for shortness of breath.    All other systems reviewed and are negative.    Physical Exam  Vitals and nursing note reviewed.   HENT:      Head: Normocephalic.      Nose: Nose normal.   Eyes:      Pupils: Pupils are equal, round, and reactive to light.   Cardiovascular:      Rate and Rhythm: Normal rate and regular rhythm.      Pulses: Normal pulses.   Pulmonary:      Effort: Pulmonary effort is normal.      Breath sounds: Normal breath sounds.   Abdominal:      General: Bowel sounds are normal.      Palpations: Abdomen is soft.   Musculoskeletal:         General: Normal range of motion.      Cervical back: Normal range of motion.   Skin:     General: Skin is warm.      Comments: R Wrist Soft/Flat, Non-Tender, No Sign of Bleed/Infection. +2 BLE Palpable Radial Pulses     Neurological:      Mental Status: He is alert and oriented to person, place, and time.   Psychiatric:         Mood and Affect: Mood normal.         Behavior: Behavior normal.       Disposition: Follow-up with Dr. Armando in 1 week   Follow Up:   Follow-up Information       Shoaib Rodriguez PA Follow up in 2 week(s).    Specialty: Internal Medicine  Contact information:  [address]  [place] [address]  [phone]               Tez Armando MD Follow up in 1 week(s).    Specialty: Cardiology  Why: [place] location.   Hospital follow up. Will need cardiac MRI outpt  Contact information:  [address]  [address]  [place] [address]  [phone]                           Patient Instructions:   No discharge procedures on file.  Medications:  Reconciled Home  Medications:      Medication List        START taking these medications      colchicine 0.6 mg tablet  Commonly known as: COLCRYS  Take 1 tablet (0.6 mg total) by mouth 2 (two) times daily.     ibuprofen 600 MG tablet  Commonly known as: ADVIL,MOTRIN  Take 1 tablet (600 mg total) by mouth 3 (three) times daily. for 14 days  Replaces: ibuprofen 20 mg/mL oral liquid     pantoprazole 40 MG tablet  Commonly known as: PROTONIX  Take 1 tablet (40 mg total) by mouth once daily.            STOP taking these medications      ibuprofen 20 mg/mL oral liquid  Replaced by: ibuprofen 600 MG tablet            Impression:  Acute Myopericarditis in the Setting of Recent Viral Illness    - Serial EKGs with Diffuse ST Elevations  NSTEMI Type II  secondary to Myopericarditis    - Peaked Troponin 25.339  [facility] (4.21.24): No obstructive coronary artery disease with less than 10% stenosis in all major epicardial vessels     - EF 45-50% on ECHO   Hx of Torn ACL s/p Repair  No Hx of GIB     Plan:   Discharge Home   ECHO Reviewed   Ibuprofen 600 mg oral TID for 2 weeks  Colchicine 0.6 mg oral BID for 3-6 months   Protonix 40mg oral daily until Colchicine discontinued   Consider BB in the outpatient setting; will defer to Dr. Armando   Wrist Precautions     - Do not drive for 1 week    - Do not lift anything greater than 5 pounds for 1 week    - Showers only for 1 week    - Monitor for signs of infection/bleeding   Follow-up with Dr. Armando in 1 week     Time spent on the discharge of patient: 36 minutes    VICKI Harry  Cardiology  [facility] - [address] [facility] Medical Telemetry

## 2024-04-22 NOTE — PLAN OF CARE
04/22/24 0902   Discharge Assessment   Assessment Type Discharge Planning Assessment   Confirmed/corrected address, phone number and insurance Yes   Confirmed Demographics Correct on Facesheet   Source of Information patient   When was your last doctors appointment?   (pt does not have a PCP)   Does patient/caregiver understand observation status Yes   Communicated SERGEY with patient/caregiver Yes   Reason For Admission troponin level elevated, acute percarditis   People in Home parent(s)   Facility Arrived From: parents home   Do you expect to return to your current living situation? Yes   Do you have help at home or someone to help you manage your care at home? Yes   Who are your caregiver(s) and their phone number(s)? parents   Prior to hospitilization cognitive status: Alert/Oriented   Current cognitive status: Alert/Oriented   Walking or Climbing Stairs Difficulty no   Dressing/Bathing Difficulty no   Equipment Currently Used at Home none   Patient currently being followed by outpatient case management? No   Do you currently have service(s) that help you manage your care at home? No   Do you take prescription medications? No   Who is going to help you get home at discharge? parents   How do you get to doctors appointments? car, drives self   Are you on dialysis? No   Discharge Plan A Home   Discharge Plan B Home Health   DME Needed Upon Discharge  none   Discharge Plan discussed with: Patient   Transition of Care Barriers None   Housing Stability   In the last 12 months, was there a time when you were not able to pay the mortgage or rent on time? N   At any time in the past 12 months, were you homeless or living in a shelter (including now)? N   Transportation Needs   In the past 12 months, has lack of transportation kept you from medical appointments or from getting medications? no   In the past 12 months, has lack of transportation kept you from meetings, work, or from getting things needed for daily living?  No   Food Insecurity   Within the past 12 months, you worried that your food would run out before you got the money to buy more. Never true   Within the past 12 months, the food you bought just didn't last and you didn't have money to get more. Never true   Social Connections   Are you , , , , never , or living with a partner? Never marrie     Pt lives at parents home. He does not attend college. He does not use DME. Needs TBD.

## 2024-04-22 NOTE — PLAN OF CARE
04/22/24 1041   Final Note   Assessment Type Final Discharge Note   Anticipated Discharge Disposition Home   Post-Acute Status   Discharge Delays None known at this time     Pt will dc to parents home - self care. No needs noted from CM.

## 2024-04-22 NOTE — NURSING
Patient and mother educated on all discharge information including follow up appointment with PCP and [facility] in [place]. Okay to follow up with [facility] NP per DEANGELO Dacosta.  Instructed patient on signs and symptoms to watch for and when to return to ED when needed. IV and Tele discontinued. VSS. NAD. No complaints of pain. Prescriptions sent to [facility] pharmacy in [place]. Patient and mother verbalizes understanding of all discharge information and denies any further needs. Work excuse given to patient. Patient being discharged home in private vehicle with mother independently.

## 2024-04-22 NOTE — NURSING
Nurses Note -- 4 Eyes      4/22/2024   12:01 PM      Skin assessed during: Assessment      [x] No Altered Skin Integrity Present    [x]Prevention Measures Documented      [] Yes- Altered Skin Integrity Present or Discovered   [] LDA Added if Not in Epic (Describe Wound)   [] New Altered Skin Integrity was Present on Admit and Documented in LDA   [] Wound Image Taken    Wound Care Consulted? No    Attending Nurse: Yolette Hutton RN/Staff Member:   LINDEN Childs

## 2024-04-23 ENCOUNTER — PATIENT OUTREACH (OUTPATIENT)
Dept: ADMINISTRATIVE | Facility: CLINIC | Age: 24
End: 2024-04-23
Payer: COMMERCIAL

## 2024-04-23 NOTE — PROGRESS NOTES
C3 nurse spoke with Migue Mistry  for a TCC post hospital discharge follow up call. The patient has a scheduled HOSFU appointment with Dr. Lopez Bueno on 04/24/2024 @ 2 pm. Appointment with ILIANA Caldwell NP @ Dr. Armando's office on 05/02/2024 @ 1245 pm.         
24

## 2024-04-24 ENCOUNTER — OFFICE VISIT (OUTPATIENT)
Dept: FAMILY MEDICINE | Facility: CLINIC | Age: 24
End: 2024-04-24
Payer: COMMERCIAL

## 2024-04-24 VITALS
DIASTOLIC BLOOD PRESSURE: 48 MMHG | SYSTOLIC BLOOD PRESSURE: 101 MMHG | HEIGHT: 70 IN | WEIGHT: 193 LBS | RESPIRATION RATE: 16 BRPM | OXYGEN SATURATION: 98 % | HEART RATE: 61 BPM | TEMPERATURE: 98 F | BODY MASS INDEX: 27.63 KG/M2

## 2024-04-24 DIAGNOSIS — Z86.79 HISTORY OF PERICARDITIS: Primary | ICD-10-CM

## 2024-04-24 DIAGNOSIS — I25.2 HISTORY OF NON-ST ELEVATION MYOCARDIAL INFARCTION (NSTEMI): Chronic | ICD-10-CM

## 2024-04-24 PROBLEM — Z72.0 TOBACCO USER: Status: ACTIVE | Noted: 2024-04-24

## 2024-04-24 PROBLEM — Z72.0 TOBACCO USER: Status: RESOLVED | Noted: 2024-04-24 | Resolved: 2024-04-24

## 2024-04-24 PROCEDURE — 1159F MED LIST DOCD IN RCRD: CPT | Mod: CPTII,,, | Performed by: FAMILY MEDICINE

## 2024-04-24 PROCEDURE — 99496 TRANSJ CARE MGMT HIGH F2F 7D: CPT | Mod: ,,, | Performed by: FAMILY MEDICINE

## 2024-04-24 PROCEDURE — 1160F RVW MEDS BY RX/DR IN RCRD: CPT | Mod: CPTII,,, | Performed by: FAMILY MEDICINE

## 2024-04-24 PROCEDURE — 3078F DIAST BP <80 MM HG: CPT | Mod: CPTII,,, | Performed by: FAMILY MEDICINE

## 2024-04-24 PROCEDURE — 1111F DSCHRG MED/CURRENT MED MERGE: CPT | Mod: CPTII,,, | Performed by: FAMILY MEDICINE

## 2024-04-24 PROCEDURE — 3074F SYST BP LT 130 MM HG: CPT | Mod: CPTII,,, | Performed by: FAMILY MEDICINE

## 2024-04-24 NOTE — PROGRESS NOTES
Patient ID: [id number]     Chief Complaint: Establish Care and Transitional Care ([facility]u 4/22/24 - Acute Myeopericarditis, NSTEMI secondary to Myeopericarditis, NICMO. Has f/u appt with Dr Armando 5/2 at [facility].)    HPI:     Migue Mistry is a 24 y.o. male here today for Establish Care and Transitional Care ([facility] f/u 4/22/24 - Acute Myeopericarditis, NSTEMI secondary to Myeopericarditis, NICMO. Has f/u appt with Dr Armando 5/2 at [facility].). Still having some chest pain with deep inspiration. [employer].     -------------------------------------    Acute pericarditis    GERD (gastroesophageal reflux disease)    NICM (nonischemic cardiomyopathy)    NSTEMI (non-ST elevated myocardial infarction)    Tobacco user    Torn ACL        Past Surgical History:   Procedure Laterality Date    ANGIOGRAM, CORONARY, WITH LEFT HEART CATHETERIZATION N/A 04/21/2024    Dr Tez Armando    KNEE ARTHROSCOPY W/ ACL RECONSTRUCTION Left 10/27/2022    Dr Yariel Judge Jr    VENTRICULOGRAM, LEFT  04/21/2024    Dr Tez Armando     Review of patient's allergies indicates:  No Known Allergies    Current Outpatient Medications   Medication Sig Dispense Refill    colchicine (COLCRYS) 0.6 mg tablet Take 1 tablet (0.6 mg total) by mouth 2 (two) times daily. 60 tablet 5    ibuprofen (ADVIL,MOTRIN) 600 MG tablet Take 1 tablet (600 mg total) by mouth 3 (three) times daily. for 14 days 42 tablet 0    pantoprazole (PROTONIX) 40 MG tablet Take 1 tablet (40 mg total) by mouth once daily. 60 tablet 5     No current facility-administered medications for this visit.       Social History     Socioeconomic History    Marital status: Single   Tobacco Use    Smoking status: Never    Smokeless tobacco: Never   Substance and Sexual Activity    Alcohol use: Not Currently     Alcohol/week: 1.0 standard drink of alcohol     Comment: occasional    Drug use: Never    Sexual activity: Not Currently     Partners: Female     Social  Determinants of Health     Financial Resource Strain: Low Risk  (4/24/2024)    Overall Financial Resource Strain (CARDIA)     Difficulty of Paying Living Expenses: Not very hard   Food Insecurity: No Food Insecurity (4/24/2024)    Hunger Vital Sign     Worried About Running Out of Food in the Last Year: Never true     Ran Out of Food in the Last Year: Never true   Transportation Needs: No Transportation Needs (4/24/2024)    PRAPARE - Transportation     Lack of Transportation (Medical): No     Lack of Transportation (Non-Medical): No   Physical Activity: Sufficiently Active (4/24/2024)    Exercise Vital Sign     Days of Exercise per Week: 4 days     Minutes of Exercise per Session: 60 min   Stress: No Stress Concern Present (4/24/2024)    [employer] - Occupational Stress Questionnaire     Feeling of Stress : Not at all   Social Connections: Unknown (4/24/2024)    Social Connection and Isolation Panel [NHANES]     Frequency of Communication with Friends and Family: Three times a week     Frequency of Social Gatherings with Friends and Family: Twice a week     Active Member of Clubs or Organizations: No     Attends Club or Organization Meetings: Never     Marital Status: Never    Housing Stability: Unknown (4/24/2024)    Housing Stability Vital Sign     Unable to Pay for Housing in the Last Year: No     Homeless in the Last Year: No        Family History   Problem Relation Name Age of Onset    No Known Problems Mother      No Known Problems Father      Heart attack Maternal Grandfather          Patient Care Team:  Lopez Bueno DO as PCP - General (Family Medicine)  Shoaib Rodriguez PA (Internal Medicine)  Tez Armando MD as Consulting Physician (Cardiology)     Subjective:     Review of Systems   Constitutional:  Negative for chills and fever.   Respiratory:  Negative for shortness of breath.    Cardiovascular:  Negative for chest pain.   Gastrointestinal:  Negative for  "constipation and diarrhea.   Neurological:  Negative for dizziness and headaches.   Psychiatric/Behavioral:  The patient does not have insomnia.      See HPI for details  All Other ROS: Negative except as stated in HPI.     Objective:     BP (!) 101/48   Pulse 61   Temp 97.6 °F (36.4 °C) (Temporal)   Resp 16   Ht 5' 10.08" (1.78 m)   Wt 87.5 kg (193 lb)   SpO2 98%   BMI 27.63 kg/m²     Physical Exam  Vitals reviewed.   Constitutional:       General: He is not in acute distress.     Appearance: Normal appearance. He is not ill-appearing.   Cardiovascular:      Rate and Rhythm: Normal rate and regular rhythm.      Pulses: Normal pulses.      Heart sounds: Normal heart sounds. No murmur heard.     No friction rub. No gallop.   Pulmonary:      Effort: No respiratory distress.      Breath sounds: No wheezing, rhonchi or rales.   Musculoskeletal:         General: No swelling.      Right lower leg: No edema.      Left lower leg: No edema.   Skin:     General: Skin is warm and dry.   Neurological:      General: No focal deficit present.      Mental Status: He is alert.   Psychiatric:         Mood and Affect: Mood normal.         Behavior: Behavior normal.         Assessment/Plan:     1. History of pericarditis    2. History of non-ST elevation myocardial infarction (NSTEMI)        Follow up:     Follow up in about 6 months (around 10/24/2024) for Wellness. In addition to their scheduled follow up, the patient has also been instructed to follow up on as needed basis.     "

## 2024-04-29 NOTE — PROGRESS NOTES
Transitional Care Note  Subjective:         Patient ID: Migue Mistry is a 24 y.o. male.  Chief Complaint: Establish Care and Transitional Care ([facility]/22/24 - Acute Myeopericarditis, NSTEMI secondary to Myeopericarditis, NICMO. Has f/u appt with Dr Armando 5/2 at [facility].)    Family and/or Caretaker present at visit?  No.  Diagnostic tests reviewed/disposition: No diagnosic tests pending after this hospitalization.  Disease/illness education: Acute myeopericarditis, NSTEMI  Home health/community services discussion/referrals: Patient does not have home health established from hospital visit.  They do not need home health.  If needed, we will set up home health for the patient.   Establishment or re-establishment of referral orders for community resources: No other necessary community resources.   Discussion with other health care providers: No discussion with other health care providers necessary.    Still having some chest pain with deep inspiration. [employer].         Review of Systems   Constitutional:  Negative for chills and fever.   Respiratory:  Negative for chest tightness, shortness of breath and wheezing.    Cardiovascular:  Positive for chest pain. Negative for palpitations and leg swelling.   Gastrointestinal:  Negative for abdominal pain, constipation, diarrhea and nausea.   Musculoskeletal:  Negative for back pain and myalgias.   Neurological:  Negative for dizziness and headaches.       Objective:      Physical Exam  Vitals reviewed.   Constitutional:       General: He is not in acute distress.     Appearance: Normal appearance. He is not ill-appearing.   Cardiovascular:      Rate and Rhythm: Normal rate and regular rhythm.      Pulses: Normal pulses.      Heart sounds: Normal heart sounds. No murmur heard.     No friction rub. No gallop.   Pulmonary:      Effort: No respiratory distress.      Breath sounds: No wheezing, rhonchi or rales.   Musculoskeletal:          General: No swelling.      Right lower leg: No edema.      Left lower leg: No edema.   Skin:     General: Skin is warm and dry.   Neurological:      General: No focal deficit present.      Mental Status: He is alert.   Psychiatric:         Mood and Affect: Mood normal.         Behavior: Behavior normal.         Assessment:       1. History of pericarditis    2. History of non-ST elevation myocardial infarction (NSTEMI)        Plan:         1. History of pericarditis    2. History of non-ST elevation myocardial infarction (NSTEMI)      Continue colchicine and ibuprofen as prescribed. Protonix to help minimize risk of peptic ulcer disease. Advised patient to keep appointment with Cardiology.       Current Outpatient Medications:     colchicine (COLCRYS) 0.6 mg tablet, Take 1 tablet (0.6 mg total) by mouth 2 (two) times daily., Disp: 60 tablet, Rfl: 5    ibuprofen (ADVIL,MOTRIN) 600 MG tablet, Take 1 tablet (600 mg total) by mouth 3 (three) times daily. for 14 days, Disp: 42 tablet, Rfl: 0    pantoprazole (PROTONIX) 40 MG tablet, Take 1 tablet (40 mg total) by mouth once daily., Disp: 60 tablet, Rfl: 5

## 2024-09-26 DIAGNOSIS — Z00.00 WELLNESS EXAMINATION: Primary | ICD-10-CM

## 2024-09-26 DIAGNOSIS — Z11.4 SCREENING FOR HIV (HUMAN IMMUNODEFICIENCY VIRUS): ICD-10-CM

## 2024-09-26 DIAGNOSIS — Z11.59 NEED FOR HEPATITIS C SCREENING TEST: ICD-10-CM

## 2024-09-26 DIAGNOSIS — E78.5 HYPERLIPIDEMIA, UNSPECIFIED HYPERLIPIDEMIA TYPE: ICD-10-CM

## (undated) DEVICE — SET ANGIO ACIST CVI ANGIOTOUCH

## (undated) DEVICE — CLOSURE SKIN STERI STRIP 1/2X4

## (undated) DEVICE — POSITIONER HEAD ADULT

## (undated) DEVICE — CANNULA ADULT NASAL 7FT

## (undated) DEVICE — TUBE SET INFLOW/OUTFLOW

## (undated) DEVICE — SUT MONOCRYL 2-0 S UND

## (undated) DEVICE — REAMER LOW PROFILE 10 MM

## (undated) DEVICE — DRAPE FULL SHEET 70X100IN

## (undated) DEVICE — GLOVE PROTEXIS LTX MICRO 8

## (undated) DEVICE — DRAPE STERI U-SHAPED 47X51IN

## (undated) DEVICE — GOWN POLY REINF X-LONG 2XL

## (undated) DEVICE — COVER TABLE HVY DTY 60X90IN

## (undated) DEVICE — KIT GLIDESHEATH SLEND 6FR 10CM

## (undated) DEVICE — GLOVE PROTEXIS HYDROGEL SZ6

## (undated) DEVICE — GLOVE PROTEXIS BLUE LATEX 6.5

## (undated) DEVICE — ELECTRODE PATIENT RETURN DISP

## (undated) DEVICE — BANDAGE ACE DOUBLE STER 6IN

## (undated) DEVICE — CATH EMPULSE ANGLED 5FR PIGTAI

## (undated) DEVICE — GAUZE SPONGE 4X4 12PLY

## (undated) DEVICE — CATH IMPULSE 5F 100CM FR4

## (undated) DEVICE — BIT DRILL ACL TIGHTROPE 4MM

## (undated) DEVICE — PAD PREP CUFFED NS 24X48IN

## (undated) DEVICE — SUT FIBERLINK TAPE BLU WHT 1.3

## (undated) DEVICE — PADDING CAST SOFT-ROLL 6 X 4

## (undated) DEVICE — BLADE SHAVER LANZA 4.2X13CM

## (undated) DEVICE — PENCIL ELECSURG ROCKER 15FT

## (undated) DEVICE — CATH OPTITORQUE RADIAL 5FR

## (undated) DEVICE — BLADE SURG STAINLESS STEEL #10

## (undated) DEVICE — Device

## (undated) DEVICE — GLOVE PROTEXIS HYDROGEL SZ8

## (undated) DEVICE — PEROXIDE HYDROGEN 3% 16OZ

## (undated) DEVICE — BLADE SURG STAINLESS STEEL #15

## (undated) DEVICE — DRAPE STERI INSTRUMENT 1018

## (undated) DEVICE — SUPPORT ULNA NERVE PROTECTOR

## (undated) DEVICE — SOL NACL IRR 3000ML

## (undated) DEVICE — PAD ABD 8X10 STERILE

## (undated) DEVICE — GAUZE SPONGE 4'X4 12 PLY

## (undated) DEVICE — SUT MONOCRYL 3-0 PS-2 UND

## (undated) DEVICE — PAD HEARTSTART DEFIB ADULT

## (undated) DEVICE — PACK SURGICAL KNEE SCOPE

## (undated) DEVICE — SUT VICRYL BR 1 GEN 27 CT-1

## (undated) DEVICE — KNIFE ACL GRAFT 10MM

## (undated) DEVICE — CUFF TOURNIQUET STER DIS 34

## (undated) DEVICE — SUT 38 FIBERWIRE #2

## (undated) DEVICE — APPLICATOR CHLORAPREP ORN 26ML

## (undated) DEVICE — BAND TR COMP DEVICE REG 24CM

## (undated) DEVICE — SUT BLU BR 2 TAPERD NDL 1/2

## (undated) DEVICE — CUBE COLD THERAPY POLAR PAD

## (undated) DEVICE — BLADE AGGR TOOTH MED 25X9

## (undated) DEVICE — KIT ACL DISP W/O SAW BLADE

## (undated) DEVICE — CONTAINER SPECIMEN 4.5OZ